# Patient Record
Sex: FEMALE | Race: WHITE | NOT HISPANIC OR LATINO
[De-identification: names, ages, dates, MRNs, and addresses within clinical notes are randomized per-mention and may not be internally consistent; named-entity substitution may affect disease eponyms.]

---

## 2020-08-11 PROBLEM — Z00.00 ENCOUNTER FOR PREVENTIVE HEALTH EXAMINATION: Status: ACTIVE | Noted: 2020-08-11

## 2020-08-13 ENCOUNTER — APPOINTMENT (OUTPATIENT)
Dept: ORTHOPEDIC SURGERY | Facility: CLINIC | Age: 50
End: 2020-08-13

## 2020-08-14 ENCOUNTER — APPOINTMENT (OUTPATIENT)
Dept: ORTHOPEDIC SURGERY | Facility: CLINIC | Age: 50
End: 2020-08-14
Payer: COMMERCIAL

## 2020-08-14 VITALS
BODY MASS INDEX: 30.21 KG/M2 | WEIGHT: 160 LBS | HEART RATE: 66 BPM | SYSTOLIC BLOOD PRESSURE: 131 MMHG | DIASTOLIC BLOOD PRESSURE: 77 MMHG | HEIGHT: 61 IN

## 2020-08-14 DIAGNOSIS — M19.90 UNSPECIFIED OSTEOARTHRITIS, UNSPECIFIED SITE: ICD-10-CM

## 2020-08-14 DIAGNOSIS — Z87.39 PERSONAL HISTORY OF OTHER DISEASES OF THE MUSCULOSKELETAL SYSTEM AND CONNECTIVE TISSUE: ICD-10-CM

## 2020-08-14 DIAGNOSIS — Z78.9 OTHER SPECIFIED HEALTH STATUS: ICD-10-CM

## 2020-08-14 PROCEDURE — 73562 X-RAY EXAM OF KNEE 3: CPT | Mod: 50

## 2020-08-14 PROCEDURE — 99204 OFFICE O/P NEW MOD 45 MIN: CPT

## 2020-08-14 PROCEDURE — 73502 X-RAY EXAM HIP UNI 2-3 VIEWS: CPT | Mod: LT

## 2020-09-02 PROBLEM — Z78.9 DENIES ALCOHOL CONSUMPTION: Status: ACTIVE | Noted: 2020-09-02

## 2020-09-02 PROBLEM — M19.90 ARTHRITIS: Status: RESOLVED | Noted: 2020-09-02 | Resolved: 2020-09-02

## 2020-09-02 PROBLEM — Z87.39 HISTORY OF FIBROMYALGIA: Status: RESOLVED | Noted: 2020-09-02 | Resolved: 2020-09-02

## 2020-09-02 PROBLEM — Z78.9 DOES NOT USE TOBACCO: Status: ACTIVE | Noted: 2020-09-02

## 2020-09-14 NOTE — DISCUSSION/SUMMARY
[de-identified] : Right knee advanced degenerative joint disease, stable left total knee replacement, leg length discrepancy. \par The natural history and treatment of degenerative arthritis was discussed with the patient at length today. The spectrum of treatment including nonoperative modalities to surgical intervention was elucidated. Noninvasive and nonoperative treatment modalities include weight reduction, activity modification with low impact exercise,  as needed use of acetaminophen or anti-inflammatory medications if tolerated, glucosamine/chondroitin supplements, and physical therapy. Further treatments can include corticosteroid injection and the use of viscosupplementation with hyaluronic acid injections. Definitive surgical treatment can certainly include total joint arthroplasty also. The risks and benefits of each treatment options was discussed and all questions were answered.\par In view of lack of adequate pain relief with conservative (non-surgical) management protocol including physical therapy, home exercises, weight loss, activity modification, NSAIDS; the patient is recommended to consider a right Total Knee Replacement. \par \par The risks, benefits, alternatives, implications, complications including but not limited to pain, stiffness, bleeding, limp, wound breakdown, infection, bone fracture, nerve and vascular compromise, implant wear, fixation options depending on bone quality, instability, and durability issues and rehabilitation were discussed and relevant questions were addressed. The possibility of recurrent pain, no improvement in pain and actual worsening of the pain were also mentioned in conversation with the patient. Medical complications related to the patient's general medical health including deep vein thrombosis, pulmonary embolus, heart attack, stroke, death and other complications from anesthesia were discussed as well.  Anticoagulation prophylaxis medication options to address risks of deep vein thrombosis and pulmonary embolism were discussed and weighed against the risks of bleeding and wound healing complications. The patient elected Ecotrin/Xarelto prophylaxis with mechanical modalities.\par \par  I have reviewed the plan of care as well as a model of a total knee replacement implant equivalent to the one that will be used for their total knee replacement.  The patient agrees with the plan of care, as well as the use of implants for their total knee replacement.  The patient wishes to proceed and will undergo preoperative medical evaluation and clearance, attend the preoperative educational class and will schedule surgery appropriately.\par

## 2020-09-14 NOTE — HISTORY OF PRESENT ILLNESS
[Worsening] : worsening [8] : a current pain level of 8/10 [Constant] : ~He/She~ states the symptoms seem to be constant [Walking] : worsened by walking [Rest] : relieved by rest [de-identified] : Ms. GINAN PICKARD is a 50 year old female  presenting with right knee pain more than left knee pain, over the last couple years, now worsening. 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 She notes she has a leg length discrepancy as well, with her right leg longer than the left, and ambulates with a significant limp. \par She localizes the pain to the lateral anterior aspect of the bilateral knees. She  describes the pain as throbbing, shooting, and states the pain is present when she is moving (walking, climbing stairs, standing). She admits to occasional swelling, clicking and grinding of the knee. She has occasional buckling as well. \par She has been treated conservatively with physical therapy, cortisone injection in February 2020, along with use of medications for pain with no relief. She notes her right knee has become more knock-kneed over the last couple years as well.  she admits to limitations of quality of life, and is here to discuss options for treatment.\par

## 2020-09-14 NOTE — PHYSICAL EXAM
[de-identified] : On general examination the patient is adequately groomed and nourished. The vital parameters are as recorded. \par There is no lymphedema or diffuse swelling, no varicose veins, no skin warmth/erythema/scars/swelling, no ulcers and no palpable lymph nodes or masses in both lower extremities. Bilateral pedal pulses are well palpable.\par Upper Extremity:\par Both right and left upper extremities are unremarkable in terms of skin rash, lesions, pigmentation, redness, tenderness, swelling, joint instability, abnormal deformity or crepitus. The overall range of motion, sensation, motor tone and strength testing are normal.\par \par Knee Exam:\par The gait is antalgic, with a leg length deformity, right leg longer than left, with a significant limp due to discrepancy. \par Knee alignment:   right knee significant valgus, with left knee neutral. \par Both knees demonstrate no scars and the skin has no warmth, erythema, swelling or tenderness. \par Both knees have a range of motion of\par Extension:                    Right -5 degrees                        Left 0 degrees\par Flexion:                                   Right 110 degrees          Left 120 degrees\par Right Knee: There is lateral joint line tenderness. There is mild effusion. \par Rubén's test is positive. Shauna test is positive.\par Lachman's test, Anterior/Posterior Drawer test and Pivot Shift Tests are negative. \par There is right knee mediolateral laxity and no anteroposterior instability. \par Patella compression test is negative and patellofemoral tracking is normal with no lateral subluxation, apprehension or instability. \par Right knee quadriceps and hamstrings power is 4+.\par Left knee quadriceps and hamstrings power is 4+\par \par Hip Exam:\par The gait and station is normal\par The patient has unequal leg lengths and no pelvic tilt. Ramy/Braeden test is 7 inches on the right and 7 inches on the left. Active SLR is 60 degrees on the right and 60 degrees on the left. The right hip demonstrates no scars and the skin has no signs of inflammation or tenderness. The left hip demonstrates well healed scar from total hip replacement. \par Both Hips have a normal range of motion of flexion to 100 degrees, abduction 40 degrees, adduction 20 degrees, external rotation 40 degrees, internal rotation 20 degrees with symmetrical motion in flexion and extension. There is no flexion contracture, deformity or instability. Labral impingement tests are negative.\par Both hips flexor, abductor and extensor power is normal.\par  [de-identified] : The following radiographs were ordered and read by me during this patients visit. I reviewed each radiograph in detail with the patient and discussed the findings as highlighted below. \par AP, lateral and skyline views of the bilateral knees confirm advanced degenerative joint disease of the right knee with lateral joint line bone on bone articulation, along with valgus malalignment. There is moderate DJD of the left knee as well with osteophyte formation\par AP view of the pelvis reveal s/p left total hip replacement SROM, stable. \par

## 2020-12-15 ENCOUNTER — APPOINTMENT (OUTPATIENT)
Dept: ORTHOPEDIC SURGERY | Facility: CLINIC | Age: 50
End: 2020-12-15
Payer: COMMERCIAL

## 2020-12-15 PROCEDURE — 99214 OFFICE O/P EST MOD 30 MIN: CPT

## 2020-12-15 PROCEDURE — 99072 ADDL SUPL MATRL&STAF TM PHE: CPT

## 2020-12-15 NOTE — DATA REVIEWED
[de-identified] : 8/14/2020–right knee x-rays: 20 degree valgus deformity with severe degenerative change.

## 2020-12-15 NOTE — DISCUSSION/SUMMARY
[de-identified] : This is a 50-year-old female with end-stage valgus right degenerative knee arthritis.  She has failed multiple nonoperative treatment modalities and is therefore indicated for a right total knee arthroplasty. We discussed risks, benefits and alternatives. Rationale of care was reviewed and all questions were answered. Surgical risks include but are not limited to infection, bleeding, nerve damage, chronic pain, loss of ROM, fracture, VTE, and need for further surgical procedures. Patient understood all of these risks and would like to proceed. \par \par Given that she had a recent steroid injection 3 weeks ago, we will have to delay the surgery due to the increased risks of infection when performing knee arthroplasty within 3 months of a steroid injection.  We will therefore plan for right total knee arthroplasty on 3/11/2020.  The patient will call the office in late January/early February at which point I will order an MRI for preoperative planning of the PSI cutting guides.

## 2020-12-15 NOTE — HISTORY OF PRESENT ILLNESS
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 Pitt, [place]), s/p R knee partial meniscectomy (2013, [place]) who is presenting for evaluation of right knee DJD.  She was seen by Dr. Flores 8/2020, who indicated her for total knee arthroplasty.  She continues to have severe pain despite a recent injection 3 weeks ago by Dr. Finkelstein, her pain management specialist.  She has 0 out of 10 pain at rest and 10 out of 10 pain with walking.  The pain is localized to the lateral aspect of her knee and does not radiate.  She generally uses a cane at baseline and cannot go up and down stairs very well due to the pain.  She has a chronic leg length discrepancy with the left leg shorter than the right, uses a shoe lift.  She is currently taking Duexis for pain control, without relief.  Denies any constitutional symptoms.  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

## 2020-12-15 NOTE — PHYSICAL EXAM
[FreeTextEntry1] : General: well nourished, in no acute distress, alert and oriented to person, place and time.\par Psychiatric: normal mood and affect, no abnormal movements or speech patterns.\par Eyes: vision intact without deficits, sclera and conjunctiva were normal, pupils were equal in size. \par ENT: Ears and nose were normal in appearance. No thyromegaly.\par Lymph: no enlarged nodes, no lymphedema in extremity.\par Respiratory: Normal respiratory rhythm and effort. No wheezing detected without auscultation. No shortness of breath or respiratory distress.\par Cardiac: no cardiac related leg swelling, 2+ peripheral pulses.\par Neurology: normal gross sensation in extremities to light touch.\par Abdomen: soft, non-tender, tympanic, no masses.\par \par RLE:\par \par Skin CDI. Mild effusion. No swelling, or ecchymosis. ROM: 0-130 degrees w/ pain. No varus/valgus instability. +lateral joint line TTP. No TTP over quadriceps/patellar tendon. No TTP over tibial tubercle or pes insertion. No palpable masses. No lymphedema.\par Neg Lachman. \par Alignment: Severe valgus, ~10-15 degrees.\par 1-2cm LLD. \par EHL/FHL/GS/TA 5/5. S/S/SP/DP/T SILT. Toes warm, BCR. Compartments soft.

## 2021-01-19 ENCOUNTER — APPOINTMENT (OUTPATIENT)
Dept: ORTHOPEDIC SURGERY | Facility: CLINIC | Age: 51
End: 2021-01-19

## 2021-02-22 ENCOUNTER — FORM ENCOUNTER (OUTPATIENT)
Age: 51
End: 2021-02-22

## 2021-02-26 ENCOUNTER — APPOINTMENT (OUTPATIENT)
Dept: MRI IMAGING | Facility: CLINIC | Age: 51
End: 2021-02-26
Payer: COMMERCIAL

## 2021-02-26 ENCOUNTER — RESULT REVIEW (OUTPATIENT)
Age: 51
End: 2021-02-26

## 2021-02-26 ENCOUNTER — OUTPATIENT (OUTPATIENT)
Dept: OUTPATIENT SERVICES | Facility: HOSPITAL | Age: 51
LOS: 1 days | End: 2021-02-26
Payer: MEDICAID

## 2021-02-26 DIAGNOSIS — M17.11 UNILATERAL PRIMARY OSTEOARTHRITIS, RIGHT KNEE: ICD-10-CM

## 2021-02-26 PROCEDURE — 73721 MRI JNT OF LWR EXTRE W/O DYE: CPT

## 2021-02-26 PROCEDURE — 73721 MRI JNT OF LWR EXTRE W/O DYE: CPT | Mod: 26,RT

## 2021-03-01 ENCOUNTER — APPOINTMENT (OUTPATIENT)
Dept: ORTHOPEDIC SURGERY | Facility: CLINIC | Age: 51
End: 2021-03-01
Payer: COMMERCIAL

## 2021-03-01 VITALS
BODY MASS INDEX: 31.15 KG/M2 | SYSTOLIC BLOOD PRESSURE: 136 MMHG | HEART RATE: 79 BPM | OXYGEN SATURATION: 99 % | WEIGHT: 165 LBS | DIASTOLIC BLOOD PRESSURE: 88 MMHG | HEIGHT: 61 IN

## 2021-03-01 PROCEDURE — 99072 ADDL SUPL MATRL&STAF TM PHE: CPT

## 2021-03-01 PROCEDURE — 73562 X-RAY EXAM OF KNEE 3: CPT | Mod: RT

## 2021-03-01 PROCEDURE — 99214 OFFICE O/P EST MOD 30 MIN: CPT

## 2021-03-01 NOTE — DATA REVIEWED
[de-identified] : 3/1/21-right knee x-rays (AP, varus and valgus stress views): 20 degree valgus deformity with severe degenerative change- possibly correctable but may be due to change in rotation of xray. No fx's or lesions.

## 2021-03-01 NOTE — HISTORY OF PRESENT ILLNESS
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 Pitt, [place]), s/p R knee partial meniscectomy (2013, [place]) who returns for FU of R knee DJD. She was seen by Dr. Flores 8/2020, who indicated her for total knee arthroplasty. She continues to have severe pain despite a previous steroid injection, NSAIDs, and PT. She is here today to discuss TKA. \par

## 2021-03-01 NOTE — DISCUSSION/SUMMARY
[de-identified] : This is a 51-year-old female with end-stage valgus right degenerative knee arthritis. She has failed multiple nonoperative treatment modalities and is therefore indicated for a right total knee arthroplasty. We discussed risks, benefits and alternatives. Rationale of care was reviewed and all questions were answered. Surgical risks include but are not limited to infection, bleeding, nerve damage, chronic pain, loss of ROM, fracture, VTE, and need for further surgical procedures. Patient understood all of these risks and would like to proceed. \par \par Tentative surgical date is for 3/25/21.\par

## 2021-03-01 NOTE — PHYSICAL EXAM
[FreeTextEntry1] : General: well nourished, in no acute distress, alert and oriented to person, place and time.\par Psychiatric: normal mood and affect, no abnormal movements or speech patterns.\par Eyes: vision intact without deficits, sclera and conjunctiva were normal, pupils were equal in size. \par ENT: Ears and nose were normal in appearance. No thyromegaly.\par Lymph: no enlarged nodes, no lymphedema in extremity.\par Respiratory: Normal respiratory rhythm and effort. No wheezing detected without auscultation. No shortness of breath or respiratory distress.\par Cardiac: no cardiac related leg swelling, 2+ peripheral pulses.\par Neurology: normal gross sensation in extremities to light touch.\par Abdomen: soft, non-tender, tympanic, no masses.\par \par RLE:\par \par ROM: 0-130 degrees w/ pain. No varus/valgus instability. +lateral joint line TTP. No TTP over quadriceps/patellar tendon. No TTP over tibial tubercle or pes insertion. No palpable masses. No lymphedema.\par Neg Lachman. \par Alignment: Severe valgus, ~10-15 degrees.\par 1-2cm LLD. \par EHL/FHL/GS/TA 5/5. S/S/SP/DP/T SILT. Toes warm, BCR. Compartments soft. \par \par

## 2021-03-08 ENCOUNTER — OUTPATIENT (OUTPATIENT)
Dept: OUTPATIENT SERVICES | Facility: HOSPITAL | Age: 51
LOS: 1 days | End: 2021-03-08
Payer: COMMERCIAL

## 2021-03-08 VITALS
SYSTOLIC BLOOD PRESSURE: 118 MMHG | RESPIRATION RATE: 16 BRPM | WEIGHT: 167.99 LBS | HEART RATE: 65 BPM | DIASTOLIC BLOOD PRESSURE: 70 MMHG | TEMPERATURE: 98 F | OXYGEN SATURATION: 99 % | HEIGHT: 59 IN

## 2021-03-08 DIAGNOSIS — M17.0 BILATERAL PRIMARY OSTEOARTHRITIS OF KNEE: ICD-10-CM

## 2021-03-08 DIAGNOSIS — Z98.890 OTHER SPECIFIED POSTPROCEDURAL STATES: Chronic | ICD-10-CM

## 2021-03-08 DIAGNOSIS — Z98.84 BARIATRIC SURGERY STATUS: Chronic | ICD-10-CM

## 2021-03-08 DIAGNOSIS — T78.40XA ALLERGY, UNSPECIFIED, INITIAL ENCOUNTER: ICD-10-CM

## 2021-03-08 DIAGNOSIS — Z90.89 ACQUIRED ABSENCE OF OTHER ORGANS: Chronic | ICD-10-CM

## 2021-03-08 DIAGNOSIS — Z96.642 PRESENCE OF LEFT ARTIFICIAL HIP JOINT: Chronic | ICD-10-CM

## 2021-03-08 LAB
ANION GAP SERPL CALC-SCNC: 11 MMOL/L — SIGNIFICANT CHANGE UP (ref 7–14)
APPEARANCE UR: CLEAR — SIGNIFICANT CHANGE UP
BILIRUB UR-MCNC: NEGATIVE — SIGNIFICANT CHANGE UP
BLD GP AB SCN SERPL QL: POSITIVE — SIGNIFICANT CHANGE UP
BUN SERPL-MCNC: 24 MG/DL — HIGH (ref 7–23)
CALCIUM SERPL-MCNC: 9.7 MG/DL — SIGNIFICANT CHANGE UP (ref 8.4–10.5)
CHLORIDE SERPL-SCNC: 103 MMOL/L — SIGNIFICANT CHANGE UP (ref 98–107)
CO2 SERPL-SCNC: 25 MMOL/L — SIGNIFICANT CHANGE UP (ref 22–31)
COLOR SPEC: SIGNIFICANT CHANGE UP
CREAT SERPL-MCNC: 0.58 MG/DL — SIGNIFICANT CHANGE UP (ref 0.5–1.3)
DIFF PNL FLD: NEGATIVE — SIGNIFICANT CHANGE UP
GLUCOSE SERPL-MCNC: 84 MG/DL — SIGNIFICANT CHANGE UP (ref 70–99)
GLUCOSE UR QL: NEGATIVE — SIGNIFICANT CHANGE UP
HCG SERPL-ACNC: <5 MIU/ML — SIGNIFICANT CHANGE UP
HCT VFR BLD CALC: 43 % — SIGNIFICANT CHANGE UP (ref 34.5–45)
HGB BLD-MCNC: 13.7 G/DL — SIGNIFICANT CHANGE UP (ref 11.5–15.5)
KETONES UR-MCNC: NEGATIVE — SIGNIFICANT CHANGE UP
LEUKOCYTE ESTERASE UR-ACNC: NEGATIVE — SIGNIFICANT CHANGE UP
MCHC RBC-ENTMCNC: 30.8 PG — SIGNIFICANT CHANGE UP (ref 27–34)
MCHC RBC-ENTMCNC: 31.9 GM/DL — LOW (ref 32–36)
MCV RBC AUTO: 96.6 FL — SIGNIFICANT CHANGE UP (ref 80–100)
NITRITE UR-MCNC: NEGATIVE — SIGNIFICANT CHANGE UP
NRBC # BLD: 0 /100 WBCS — SIGNIFICANT CHANGE UP
NRBC # FLD: 0 K/UL — SIGNIFICANT CHANGE UP
PH UR: 6 — SIGNIFICANT CHANGE UP (ref 5–8)
PLATELET # BLD AUTO: 335 K/UL — SIGNIFICANT CHANGE UP (ref 150–400)
POTASSIUM SERPL-MCNC: 4.4 MMOL/L — SIGNIFICANT CHANGE UP (ref 3.5–5.3)
POTASSIUM SERPL-SCNC: 4.4 MMOL/L — SIGNIFICANT CHANGE UP (ref 3.5–5.3)
PROT UR-MCNC: NEGATIVE — SIGNIFICANT CHANGE UP
RBC # BLD: 4.45 M/UL — SIGNIFICANT CHANGE UP (ref 3.8–5.2)
RBC # FLD: 12.5 % — SIGNIFICANT CHANGE UP (ref 10.3–14.5)
RH IG SCN BLD-IMP: POSITIVE — SIGNIFICANT CHANGE UP
SODIUM SERPL-SCNC: 139 MMOL/L — SIGNIFICANT CHANGE UP (ref 135–145)
SP GR SPEC: 1.01 — SIGNIFICANT CHANGE UP (ref 1.01–1.02)
UROBILINOGEN FLD QL: SIGNIFICANT CHANGE UP
WBC # BLD: 5.25 K/UL — SIGNIFICANT CHANGE UP (ref 3.8–10.5)
WBC # FLD AUTO: 5.25 K/UL — SIGNIFICANT CHANGE UP (ref 3.8–10.5)

## 2021-03-08 PROCEDURE — 86077 PHYS BLOOD BANK SERV XMATCH: CPT

## 2021-03-08 PROCEDURE — 93010 ELECTROCARDIOGRAM REPORT: CPT

## 2021-03-08 RX ORDER — SODIUM CHLORIDE 9 MG/ML
3 INJECTION INTRAMUSCULAR; INTRAVENOUS; SUBCUTANEOUS EVERY 8 HOURS
Refills: 0 | Status: DISCONTINUED | OUTPATIENT
Start: 2021-03-25 | End: 2021-04-01

## 2021-03-08 RX ORDER — ACETAMINOPHEN 500 MG
975 TABLET ORAL ONCE
Refills: 0 | Status: DISCONTINUED | OUTPATIENT
Start: 2021-03-25 | End: 2021-03-27

## 2021-03-08 NOTE — H&P PST ADULT - NEGATIVE GENERAL GENITOURINARY SYMPTOMS
Had UTI in January 2021 treated with antibiotic, no reoccurrence/no hematuria/no renal colic/no flank pain L/no flank pain R/no urine discoloration

## 2021-03-08 NOTE — H&P PST ADULT - NSICDXPASTSURGICALHX_GEN_ALL_CORE_FT
PAST SURGICAL HISTORY:  H/O bariatric surgery sleeve 2016    H/O lithotripsy 2017    H/O meniscectomy of right knee     History of tonsillectomy     History of total hip replacement, left 2005

## 2021-03-08 NOTE — H&P PST ADULT - NSICDXPROBLEM_GEN_ALL_CORE_FT
PROBLEM DIAGNOSES  Problem: Bilateral primary osteoarthritis of knee  Assessment and Plan: Patient tentatively scheduled for  right total knee arthroplasty on 3/25/21.  Pre-op instructions provided. Pt given verbal and written instructions with teach back on chlorhexidine shampoo and pepcid. Pt verbalized understanding with return demonstration.   Covid testing scheduled prior to surgery as per patient.   Patient scheduled for medical evaluation as per surgeon, copy requested.   Comparison EKG requested   Urine cup provided for day of procedure pregnancy test.     Problem: Allergy  Assessment and Plan: Patient allergic to PCN. OR booking notified.

## 2021-03-08 NOTE — H&P PST ADULT - HISTORY OF PRESENT ILLNESS
51 year old female presents to Presurgical testing with diagnosis of Bilateral primary osteoarthritis of knee scheduled for right total knee arthroplasty. Patient states that right knee pain has been going on for the last few years, tried conservative treatment but no relief.

## 2021-03-08 NOTE — H&P PST ADULT - ASSESSMENT
Bilateral primary osteoarthritis of knee Bilateral primary osteoarthritis of knee      Attending:    I have consented the patient for right TKA. We discussed risks, benefits and alternatives. Rationale of care was reviewed and all questions were answered. Surgical risks include but are not limited to infection, bleeding, nerve damage, chronic pain, limited ROM, weakness, LLD, fracture, component loosening, VTE, loss of life/limb, and need for further surgical procedures.  The patient understood all of this and would like to proceed.

## 2021-03-08 NOTE — H&P PST ADULT - NEGATIVE RESPIRATORY AND THORAX SYMPTOMS
Pt sleeping with easy respirations on the cart. Awaiting CT exams.   no wheezing/no dyspnea/no cough

## 2021-03-09 LAB
MRSA PCR RESULT.: SIGNIFICANT CHANGE UP
S AUREUS DNA NOSE QL NAA+PROBE: DETECTED

## 2021-03-12 LAB
CULTURE RESULTS: NO GROWTH — SIGNIFICANT CHANGE UP
SPECIMEN SOURCE: SIGNIFICANT CHANGE UP

## 2021-03-20 DIAGNOSIS — Z01.818 ENCOUNTER FOR OTHER PREPROCEDURAL EXAMINATION: ICD-10-CM

## 2021-03-22 ENCOUNTER — APPOINTMENT (OUTPATIENT)
Dept: DISASTER EMERGENCY | Facility: CLINIC | Age: 51
End: 2021-03-22

## 2021-03-23 LAB — SARS-COV-2 N GENE NPH QL NAA+PROBE: NOT DETECTED

## 2021-03-24 ENCOUNTER — TRANSCRIPTION ENCOUNTER (OUTPATIENT)
Age: 51
End: 2021-03-24

## 2021-03-24 NOTE — ASU PATIENT PROFILE, ADULT - PSH
H/O bariatric surgery  sleeve 2016  H/O lithotripsy  2017  H/O meniscectomy of right knee    History of tonsillectomy    History of total hip replacement, left  2005

## 2021-03-24 NOTE — ASU PATIENT PROFILE, ADULT - PMH
Adolescent idiopathic scoliosis of lumbosacral spine  1979  Bilateral primary osteoarthritis of knee    Fibromyalgia  2015  Kidney stone

## 2021-03-25 ENCOUNTER — INPATIENT (INPATIENT)
Facility: HOSPITAL | Age: 51
LOS: 6 days | Discharge: NURSING, REHAB OR LONG-TERM CARE | End: 2021-04-01
Attending: ORTHOPAEDIC SURGERY | Admitting: ORTHOPAEDIC SURGERY
Payer: COMMERCIAL

## 2021-03-25 ENCOUNTER — RESULT REVIEW (OUTPATIENT)
Age: 51
End: 2021-03-25

## 2021-03-25 ENCOUNTER — APPOINTMENT (OUTPATIENT)
Dept: ORTHOPEDIC SURGERY | Facility: HOSPITAL | Age: 51
End: 2021-03-25

## 2021-03-25 VITALS
RESPIRATION RATE: 16 BRPM | DIASTOLIC BLOOD PRESSURE: 74 MMHG | HEIGHT: 59 IN | WEIGHT: 167.99 LBS | TEMPERATURE: 97 F | OXYGEN SATURATION: 98 % | SYSTOLIC BLOOD PRESSURE: 126 MMHG | HEART RATE: 74 BPM

## 2021-03-25 DIAGNOSIS — Z98.84 BARIATRIC SURGERY STATUS: Chronic | ICD-10-CM

## 2021-03-25 DIAGNOSIS — Z98.890 OTHER SPECIFIED POSTPROCEDURAL STATES: Chronic | ICD-10-CM

## 2021-03-25 DIAGNOSIS — M17.0 BILATERAL PRIMARY OSTEOARTHRITIS OF KNEE: ICD-10-CM

## 2021-03-25 DIAGNOSIS — Z90.89 ACQUIRED ABSENCE OF OTHER ORGANS: Chronic | ICD-10-CM

## 2021-03-25 DIAGNOSIS — Z96.642 PRESENCE OF LEFT ARTIFICIAL HIP JOINT: Chronic | ICD-10-CM

## 2021-03-25 LAB
ANION GAP SERPL CALC-SCNC: 7 MMOL/L — SIGNIFICANT CHANGE UP (ref 7–14)
BUN SERPL-MCNC: 17 MG/DL — SIGNIFICANT CHANGE UP (ref 7–23)
CALCIUM SERPL-MCNC: 9.2 MG/DL — SIGNIFICANT CHANGE UP (ref 8.4–10.5)
CHLORIDE SERPL-SCNC: 101 MMOL/L — SIGNIFICANT CHANGE UP (ref 98–107)
CO2 SERPL-SCNC: 28 MMOL/L — SIGNIFICANT CHANGE UP (ref 22–31)
CREAT SERPL-MCNC: 0.53 MG/DL — SIGNIFICANT CHANGE UP (ref 0.5–1.3)
GLUCOSE SERPL-MCNC: 88 MG/DL — SIGNIFICANT CHANGE UP (ref 70–99)
HCG UR QL: NEGATIVE — SIGNIFICANT CHANGE UP
HCT VFR BLD CALC: 40.1 % — SIGNIFICANT CHANGE UP (ref 34.5–45)
HGB BLD-MCNC: 13.1 G/DL — SIGNIFICANT CHANGE UP (ref 11.5–15.5)
MCHC RBC-ENTMCNC: 31.3 PG — SIGNIFICANT CHANGE UP (ref 27–34)
MCHC RBC-ENTMCNC: 32.7 GM/DL — SIGNIFICANT CHANGE UP (ref 32–36)
MCV RBC AUTO: 95.7 FL — SIGNIFICANT CHANGE UP (ref 80–100)
NRBC # BLD: 0 /100 WBCS — SIGNIFICANT CHANGE UP
NRBC # FLD: 0 K/UL — SIGNIFICANT CHANGE UP
PLATELET # BLD AUTO: 267 K/UL — SIGNIFICANT CHANGE UP (ref 150–400)
POTASSIUM SERPL-MCNC: 3.9 MMOL/L — SIGNIFICANT CHANGE UP (ref 3.5–5.3)
POTASSIUM SERPL-SCNC: 3.9 MMOL/L — SIGNIFICANT CHANGE UP (ref 3.5–5.3)
RBC # BLD: 4.19 M/UL — SIGNIFICANT CHANGE UP (ref 3.8–5.2)
RBC # FLD: 12.8 % — SIGNIFICANT CHANGE UP (ref 10.3–14.5)
SODIUM SERPL-SCNC: 136 MMOL/L — SIGNIFICANT CHANGE UP (ref 135–145)
WBC # BLD: 4.91 K/UL — SIGNIFICANT CHANGE UP (ref 3.8–10.5)
WBC # FLD AUTO: 4.91 K/UL — SIGNIFICANT CHANGE UP (ref 3.8–10.5)

## 2021-03-25 PROCEDURE — 88311 DECALCIFY TISSUE: CPT | Mod: 26

## 2021-03-25 PROCEDURE — 73560 X-RAY EXAM OF KNEE 1 OR 2: CPT | Mod: 26,RT

## 2021-03-25 PROCEDURE — 88305 TISSUE EXAM BY PATHOLOGIST: CPT | Mod: 26

## 2021-03-25 RX ORDER — ASPIRIN/CALCIUM CARB/MAGNESIUM 324 MG
325 TABLET ORAL EVERY 12 HOURS
Refills: 0 | Status: DISCONTINUED | OUTPATIENT
Start: 2021-03-25 | End: 2021-04-01

## 2021-03-25 RX ORDER — GABAPENTIN 400 MG/1
300 CAPSULE ORAL ONCE
Refills: 0 | Status: COMPLETED | OUTPATIENT
Start: 2021-03-25 | End: 2021-03-25

## 2021-03-25 RX ORDER — SODIUM CHLORIDE 9 MG/ML
1000 INJECTION, SOLUTION INTRAVENOUS
Refills: 0 | Status: DISCONTINUED | OUTPATIENT
Start: 2021-03-25 | End: 2021-03-25

## 2021-03-25 RX ORDER — DEXAMETHASONE 0.5 MG/5ML
10 ELIXIR ORAL ONCE
Refills: 0 | Status: COMPLETED | OUTPATIENT
Start: 2021-03-25 | End: 2021-03-25

## 2021-03-25 RX ORDER — OXYCODONE HYDROCHLORIDE 5 MG/1
5 TABLET ORAL
Refills: 0 | Status: DISCONTINUED | OUTPATIENT
Start: 2021-03-25 | End: 2021-03-26

## 2021-03-25 RX ORDER — ACETAMINOPHEN 500 MG
975 TABLET ORAL EVERY 8 HOURS
Refills: 0 | Status: DISCONTINUED | OUTPATIENT
Start: 2021-03-25 | End: 2021-04-01

## 2021-03-25 RX ORDER — TRAMADOL HYDROCHLORIDE 50 MG/1
50 TABLET ORAL EVERY 8 HOURS
Refills: 0 | Status: DISCONTINUED | OUTPATIENT
Start: 2021-03-25 | End: 2021-03-27

## 2021-03-25 RX ORDER — SODIUM CHLORIDE 9 MG/ML
1000 INJECTION, SOLUTION INTRAVENOUS
Refills: 0 | Status: DISCONTINUED | OUTPATIENT
Start: 2021-03-25 | End: 2021-03-29

## 2021-03-25 RX ORDER — MAGNESIUM HYDROXIDE 400 MG/1
30 TABLET, CHEWABLE ORAL DAILY
Refills: 0 | Status: DISCONTINUED | OUTPATIENT
Start: 2021-03-25 | End: 2021-04-01

## 2021-03-25 RX ORDER — FENTANYL CITRATE 50 UG/ML
25 INJECTION INTRAVENOUS
Refills: 0 | Status: DISCONTINUED | OUTPATIENT
Start: 2021-03-25 | End: 2021-03-25

## 2021-03-25 RX ORDER — HYDROMORPHONE HYDROCHLORIDE 2 MG/ML
0.5 INJECTION INTRAMUSCULAR; INTRAVENOUS; SUBCUTANEOUS ONCE
Refills: 0 | Status: DISCONTINUED | OUTPATIENT
Start: 2021-03-25 | End: 2021-04-01

## 2021-03-25 RX ORDER — GABAPENTIN 400 MG/1
100 CAPSULE ORAL THREE TIMES A DAY
Refills: 0 | Status: DISCONTINUED | OUTPATIENT
Start: 2021-03-25 | End: 2021-04-01

## 2021-03-25 RX ORDER — SODIUM CHLORIDE 9 MG/ML
500 INJECTION INTRAMUSCULAR; INTRAVENOUS; SUBCUTANEOUS ONCE
Refills: 0 | Status: COMPLETED | OUTPATIENT
Start: 2021-03-25 | End: 2021-03-25

## 2021-03-25 RX ORDER — ONDANSETRON 8 MG/1
4 TABLET, FILM COATED ORAL EVERY 6 HOURS
Refills: 0 | Status: DISCONTINUED | OUTPATIENT
Start: 2021-03-25 | End: 2021-04-01

## 2021-03-25 RX ORDER — POLYETHYLENE GLYCOL 3350 17 G/17G
17 POWDER, FOR SOLUTION ORAL AT BEDTIME
Refills: 0 | Status: DISCONTINUED | OUTPATIENT
Start: 2021-03-25 | End: 2021-04-01

## 2021-03-25 RX ORDER — SENNA PLUS 8.6 MG/1
2 TABLET ORAL AT BEDTIME
Refills: 0 | Status: DISCONTINUED | OUTPATIENT
Start: 2021-03-25 | End: 2021-04-01

## 2021-03-25 RX ORDER — SODIUM CHLORIDE 9 MG/ML
1000 INJECTION INTRAMUSCULAR; INTRAVENOUS; SUBCUTANEOUS ONCE
Refills: 0 | Status: COMPLETED | OUTPATIENT
Start: 2021-03-25 | End: 2021-03-25

## 2021-03-25 RX ORDER — PANTOPRAZOLE SODIUM 20 MG/1
40 TABLET, DELAYED RELEASE ORAL ONCE
Refills: 0 | Status: COMPLETED | OUTPATIENT
Start: 2021-03-25 | End: 2021-03-25

## 2021-03-25 RX ORDER — TRAMADOL HYDROCHLORIDE 50 MG/1
50 TABLET ORAL ONCE
Refills: 0 | Status: COMPLETED | OUTPATIENT
Start: 2021-03-25 | End: 2021-03-25

## 2021-03-25 RX ORDER — INFLUENZA VIRUS VACCINE 15; 15; 15; 15 UG/.5ML; UG/.5ML; UG/.5ML; UG/.5ML
0.5 SUSPENSION INTRAMUSCULAR ONCE
Refills: 0 | Status: ACTIVE | OUTPATIENT
Start: 2021-03-25 | End: 2022-02-21

## 2021-03-25 RX ORDER — FOLIC ACID 0.8 MG
1 TABLET ORAL DAILY
Refills: 0 | Status: DISCONTINUED | OUTPATIENT
Start: 2021-03-25 | End: 2021-03-29

## 2021-03-25 RX ORDER — DEXAMETHASONE 0.5 MG/5ML
10 ELIXIR ORAL ONCE
Refills: 0 | Status: COMPLETED | OUTPATIENT
Start: 2021-03-26 | End: 2021-03-26

## 2021-03-25 RX ORDER — KETOROLAC TROMETHAMINE 30 MG/ML
15 SYRINGE (ML) INJECTION EVERY 6 HOURS
Refills: 0 | Status: DISCONTINUED | OUTPATIENT
Start: 2021-03-25 | End: 2021-03-26

## 2021-03-25 RX ORDER — HYDROMORPHONE HYDROCHLORIDE 2 MG/ML
0.25 INJECTION INTRAMUSCULAR; INTRAVENOUS; SUBCUTANEOUS
Refills: 0 | Status: DISCONTINUED | OUTPATIENT
Start: 2021-03-25 | End: 2021-03-25

## 2021-03-25 RX ORDER — POVIDONE-IODINE 5 %
1 AEROSOL (ML) TOPICAL ONCE
Refills: 0 | Status: COMPLETED | OUTPATIENT
Start: 2021-03-25 | End: 2021-03-25

## 2021-03-25 RX ORDER — VANCOMYCIN HCL 1 G
1000 VIAL (EA) INTRAVENOUS ONCE
Refills: 0 | Status: COMPLETED | OUTPATIENT
Start: 2021-03-25 | End: 2021-03-25

## 2021-03-25 RX ORDER — FERROUS SULFATE 325(65) MG
325 TABLET ORAL DAILY
Refills: 0 | Status: DISCONTINUED | OUTPATIENT
Start: 2021-03-25 | End: 2021-04-01

## 2021-03-25 RX ORDER — ASCORBIC ACID 60 MG
500 TABLET,CHEWABLE ORAL
Refills: 0 | Status: DISCONTINUED | OUTPATIENT
Start: 2021-03-25 | End: 2021-04-01

## 2021-03-25 RX ORDER — OXYCODONE HYDROCHLORIDE 5 MG/1
10 TABLET ORAL
Refills: 0 | Status: DISCONTINUED | OUTPATIENT
Start: 2021-03-25 | End: 2021-03-26

## 2021-03-25 RX ORDER — ACETAMINOPHEN 500 MG
1000 TABLET ORAL ONCE
Refills: 0 | Status: COMPLETED | OUTPATIENT
Start: 2021-03-25 | End: 2021-03-25

## 2021-03-25 RX ORDER — CEFAZOLIN SODIUM 1 G
2000 VIAL (EA) INJECTION EVERY 8 HOURS
Refills: 0 | Status: DISCONTINUED | OUTPATIENT
Start: 2021-03-25 | End: 2021-03-25

## 2021-03-25 RX ORDER — ONDANSETRON 8 MG/1
4 TABLET, FILM COATED ORAL ONCE
Refills: 0 | Status: COMPLETED | OUTPATIENT
Start: 2021-03-25 | End: 2021-03-25

## 2021-03-25 RX ORDER — PANTOPRAZOLE SODIUM 20 MG/1
40 TABLET, DELAYED RELEASE ORAL
Refills: 0 | Status: DISCONTINUED | OUTPATIENT
Start: 2021-03-25 | End: 2021-04-01

## 2021-03-25 RX ADMIN — GABAPENTIN 100 MILLIGRAM(S): 400 CAPSULE ORAL at 13:50

## 2021-03-25 RX ADMIN — OXYCODONE HYDROCHLORIDE 10 MILLIGRAM(S): 5 TABLET ORAL at 20:15

## 2021-03-25 RX ADMIN — Medication 1 TABLET(S): at 13:50

## 2021-03-25 RX ADMIN — Medication 15 MILLIGRAM(S): at 21:12

## 2021-03-25 RX ADMIN — OXYCODONE HYDROCHLORIDE 10 MILLIGRAM(S): 5 TABLET ORAL at 15:58

## 2021-03-25 RX ADMIN — ONDANSETRON 4 MILLIGRAM(S): 8 TABLET, FILM COATED ORAL at 12:03

## 2021-03-25 RX ADMIN — HYDROMORPHONE HYDROCHLORIDE 0.25 MILLIGRAM(S): 2 INJECTION INTRAMUSCULAR; INTRAVENOUS; SUBCUTANEOUS at 12:15

## 2021-03-25 RX ADMIN — SODIUM CHLORIDE 1000 MILLILITER(S): 9 INJECTION INTRAMUSCULAR; INTRAVENOUS; SUBCUTANEOUS at 18:29

## 2021-03-25 RX ADMIN — OXYCODONE HYDROCHLORIDE 10 MILLIGRAM(S): 5 TABLET ORAL at 22:32

## 2021-03-25 RX ADMIN — Medication 1 TABLET(S): at 21:14

## 2021-03-25 RX ADMIN — Medication 102 MILLIGRAM(S): at 21:13

## 2021-03-25 RX ADMIN — Medication 500 MILLIGRAM(S): at 18:28

## 2021-03-25 RX ADMIN — GABAPENTIN 300 MILLIGRAM(S): 400 CAPSULE ORAL at 06:57

## 2021-03-25 RX ADMIN — Medication 1 APPLICATION(S): at 06:57

## 2021-03-25 RX ADMIN — POLYETHYLENE GLYCOL 3350 17 GRAM(S): 17 POWDER, FOR SOLUTION ORAL at 21:12

## 2021-03-25 RX ADMIN — SENNA PLUS 2 TABLET(S): 8.6 TABLET ORAL at 21:12

## 2021-03-25 RX ADMIN — OXYCODONE HYDROCHLORIDE 10 MILLIGRAM(S): 5 TABLET ORAL at 19:19

## 2021-03-25 RX ADMIN — Medication 1 TABLET(S): at 18:29

## 2021-03-25 RX ADMIN — SODIUM CHLORIDE 30 MILLILITER(S): 9 INJECTION, SOLUTION INTRAVENOUS at 06:56

## 2021-03-25 RX ADMIN — PANTOPRAZOLE SODIUM 40 MILLIGRAM(S): 20 TABLET, DELAYED RELEASE ORAL at 06:57

## 2021-03-25 RX ADMIN — TRAMADOL HYDROCHLORIDE 50 MILLIGRAM(S): 50 TABLET ORAL at 14:19

## 2021-03-25 RX ADMIN — Medication 400 MILLIGRAM(S): at 10:56

## 2021-03-25 RX ADMIN — TRAMADOL HYDROCHLORIDE 50 MILLIGRAM(S): 50 TABLET ORAL at 14:51

## 2021-03-25 RX ADMIN — OXYCODONE HYDROCHLORIDE 10 MILLIGRAM(S): 5 TABLET ORAL at 23:15

## 2021-03-25 RX ADMIN — OXYCODONE HYDROCHLORIDE 10 MILLIGRAM(S): 5 TABLET ORAL at 17:20

## 2021-03-25 RX ADMIN — OXYCODONE HYDROCHLORIDE 5 MILLIGRAM(S): 5 TABLET ORAL at 12:15

## 2021-03-25 RX ADMIN — SODIUM CHLORIDE 3 MILLILITER(S): 9 INJECTION INTRAMUSCULAR; INTRAVENOUS; SUBCUTANEOUS at 21:59

## 2021-03-25 RX ADMIN — Medication 325 MILLIGRAM(S): at 18:28

## 2021-03-25 RX ADMIN — SODIUM CHLORIDE 500 MILLILITER(S): 9 INJECTION INTRAMUSCULAR; INTRAVENOUS; SUBCUTANEOUS at 11:13

## 2021-03-25 RX ADMIN — HYDROMORPHONE HYDROCHLORIDE 0.25 MILLIGRAM(S): 2 INJECTION INTRAMUSCULAR; INTRAVENOUS; SUBCUTANEOUS at 11:59

## 2021-03-25 RX ADMIN — Medication 250 MILLIGRAM(S): at 21:59

## 2021-03-25 RX ADMIN — GABAPENTIN 100 MILLIGRAM(S): 400 CAPSULE ORAL at 21:12

## 2021-03-25 RX ADMIN — OXYCODONE HYDROCHLORIDE 5 MILLIGRAM(S): 5 TABLET ORAL at 11:59

## 2021-03-25 RX ADMIN — Medication 325 MILLIGRAM(S): at 13:50

## 2021-03-25 RX ADMIN — Medication 1 MILLIGRAM(S): at 13:50

## 2021-03-25 RX ADMIN — Medication 975 MILLIGRAM(S): at 18:28

## 2021-03-25 NOTE — PATIENT PROFILE ADULT - FUNCTIONAL SCREEN CURRENT LEVEL: SWALLOWING (IF SCORE 2 OR MORE FOR ANY ITEM, CONSULT REHAB SERVICES), MLM)
TANK RODRIGUEZ  75y, Male  Allergy: No Known Allergies    Hospital Day: 1d    Patient seen and examined earlier today. No acute events overnight.    PMH/PSH:  PAST MEDICAL & SURGICAL HISTORY:  COPD without exacerbation  Kidney disease  Hypothyroidism  Diabetes  HTN (hypertension)  H/O heart transplant    VITALS:  T(F): 99.7 (03-25-20 @ 08:47), Max: 104.2 (03-24-20 @ 20:30)  HR: 100 (03-25-20 @ 08:47)  BP: 152/84 (03-25-20 @ 04:30) (123/65 - 186/77)  RR: 17 (03-25-20 @ 08:47)  SpO2: 98% (03-25-20 @ 08:47)    TESTS & MEASUREMENTS:  Weight (Kg): 79.5 (03-24-20 @ 15:20)  BMI (kg/m2): 30.1 (03-24)    03-24-20 @ 07:01  -  03-25-20 @ 07:00  --------------------------------------------------------  IN: 0 mL / OUT: 600 mL / NET: -600 mL                       9.1    7.37  )-----------( 174      ( 25 Mar 2020 06:49 )             29.3     03-25    136  |  104  |  40<H>  ----------------------------<  66<L>  3.9   |  16<L>  |  3.6<H>    Ca    8.1<L>      25 Mar 2020 06:49    TPro  5.6<L>  /  Alb  3.5  /  TBili  <0.2  /  DBili  x   /  AST  26  /  ALT  21  /  AlkPhos  25<L>  03-25    LIVER FUNCTIONS - ( 25 Mar 2020 06:49 )  Alb: 3.5 g/dL / Pro: 5.6 g/dL / ALK PHOS: 25 U/L / ALT: 21 U/L / AST: 26 U/L / GGT: x           CARDIAC MARKERS ( 25 Mar 2020 06:49 )  x     / 0.09 ng/mL / x     / x     / x      CARDIAC MARKERS ( 24 Mar 2020 09:36 )  x     / 0.03 ng/mL / x     / x     / x        RECENT DIAGNOSTIC ORDERS:  COVID-19 PCR: STAT  Specimen Source: Nasopharyngeal (03-24-20 @ 11:33)  Transthoracic Echocardiogram:   Transport: Stretcher-Crib  Monitor: w/o Monitor  Addl Info: Patient is on isolation to r/o COVID -19. Please use appropriate PPE  Provider's Contact #: [phone] (03-24-20 @ 14:24)  Troponin T, Serum: 20:00 (03-24-20 @ 14:24)  Hemoglobin A1C with Mean Plasma Glucose: AM Sched. Collection: 25-Mar-2020 04:30 (03-24-20 @ 14:29)  Diet, Regular:   Consistent Carbohydrate No Snacks  DASH/TLC Sodium & Cholesterol Restricted (03-24-20 @ 16:13)    MEDICATIONS:  MEDICATIONS  (STANDING):  albuterol/ipratropium for Nebulization 3 milliLiter(s) Nebulizer every 6 hours  amLODIPine   Tablet 10 milliGRAM(s) Oral daily  atorvastatin 40 milliGRAM(s) Oral at bedtime  azithromycin  IVPB 500 milliGRAM(s) IV Intermittent every 24 hours  cefTRIAXone   IVPB 1000 milliGRAM(s) IV Intermittent every 24 hours  dextrose 5%. 1000 milliLiter(s) (50 mL/Hr) IV Continuous <Continuous>  dextrose 50% Injectable 12.5 Gram(s) IV Push once  dextrose 50% Injectable 25 Gram(s) IV Push once  dextrose 50% Injectable 25 Gram(s) IV Push once  everolimus (ZORTRESS) 1 milliGRAM(s) Oral two times a day  fenofibrate Tablet 48 milliGRAM(s) Oral daily  influenza   Vaccine 0.5 milliLiter(s) IntraMuscular once  insulin glargine Injectable (LANTUS) 10 Unit(s) SubCutaneous at bedtime  insulin lispro (HumaLOG) corrective regimen sliding scale   SubCutaneous three times a day before meals  levothyroxine 25 MICROGram(s) Oral daily  mycophenolate mofetil 1000 milliGRAM(s) Oral two times a day  pentoxifylline 400 milliGRAM(s) Oral at bedtime  pentoxifylline 800 milliGRAM(s) Oral daily  predniSONE   Tablet 2 milliGRAM(s) Oral two times a day  spironolactone 50 milliGRAM(s) Oral daily    MEDICATIONS  (PRN):  acetaminophen   Tablet .. 650 milliGRAM(s) Oral every 6 hours PRN Temp greater or equal to 38C (100.4F)  dextrose 40% Gel 15 Gram(s) Oral once PRN Blood Glucose LESS THAN 70 milliGRAM(s)/deciliter  glucagon  Injectable 1 milliGRAM(s) IntraMuscular once PRN Glucose LESS THAN 70 milligrams/deciliter    HOME MEDICATIONS:  CellCept (07-26)  everolimus (07-26)  insulin (07-26)  levothyroxine (07-26)  predniSONE (07-26)    REVIEW OF SYSTEMS:  All other review of systems is negative unless indicated above.     PHYSICAL EXAM:  GENERAL: NAD  HEENT: No Swelling  CHEST/LUNG: Good air entry, No wheezing  HEART: RRR, No murmurs  ABDOMEN: Soft, Bowel sounds present  EXTREMITIES:  No clubbing 0 = swallows foods/liquids without difficulty

## 2021-03-26 ENCOUNTER — TRANSCRIPTION ENCOUNTER (OUTPATIENT)
Age: 51
End: 2021-03-26

## 2021-03-26 DIAGNOSIS — Z29.9 ENCOUNTER FOR PROPHYLACTIC MEASURES, UNSPECIFIED: ICD-10-CM

## 2021-03-26 DIAGNOSIS — M17.0 BILATERAL PRIMARY OSTEOARTHRITIS OF KNEE: ICD-10-CM

## 2021-03-26 LAB
ANION GAP SERPL CALC-SCNC: 12 MMOL/L — SIGNIFICANT CHANGE UP (ref 7–14)
BUN SERPL-MCNC: 13 MG/DL — SIGNIFICANT CHANGE UP (ref 7–23)
CALCIUM SERPL-MCNC: 9.7 MG/DL — SIGNIFICANT CHANGE UP (ref 8.4–10.5)
CHLORIDE SERPL-SCNC: 102 MMOL/L — SIGNIFICANT CHANGE UP (ref 98–107)
CO2 SERPL-SCNC: 23 MMOL/L — SIGNIFICANT CHANGE UP (ref 22–31)
CREAT SERPL-MCNC: 0.43 MG/DL — LOW (ref 0.5–1.3)
GLUCOSE SERPL-MCNC: 172 MG/DL — HIGH (ref 70–99)
HCT VFR BLD CALC: 39.6 % — SIGNIFICANT CHANGE UP (ref 34.5–45)
HGB BLD-MCNC: 13.1 G/DL — SIGNIFICANT CHANGE UP (ref 11.5–15.5)
MCHC RBC-ENTMCNC: 31 PG — SIGNIFICANT CHANGE UP (ref 27–34)
MCHC RBC-ENTMCNC: 33.1 GM/DL — SIGNIFICANT CHANGE UP (ref 32–36)
MCV RBC AUTO: 93.6 FL — SIGNIFICANT CHANGE UP (ref 80–100)
NRBC # BLD: 0 /100 WBCS — SIGNIFICANT CHANGE UP
NRBC # FLD: 0 K/UL — SIGNIFICANT CHANGE UP
PLATELET # BLD AUTO: 284 K/UL — SIGNIFICANT CHANGE UP (ref 150–400)
POTASSIUM SERPL-MCNC: 3.9 MMOL/L — SIGNIFICANT CHANGE UP (ref 3.5–5.3)
POTASSIUM SERPL-SCNC: 3.9 MMOL/L — SIGNIFICANT CHANGE UP (ref 3.5–5.3)
RBC # BLD: 4.23 M/UL — SIGNIFICANT CHANGE UP (ref 3.8–5.2)
RBC # FLD: 12.3 % — SIGNIFICANT CHANGE UP (ref 10.3–14.5)
SODIUM SERPL-SCNC: 137 MMOL/L — SIGNIFICANT CHANGE UP (ref 135–145)
WBC # BLD: 8.96 K/UL — SIGNIFICANT CHANGE UP (ref 3.8–10.5)
WBC # FLD AUTO: 8.96 K/UL — SIGNIFICANT CHANGE UP (ref 3.8–10.5)

## 2021-03-26 PROCEDURE — 99223 1ST HOSP IP/OBS HIGH 75: CPT

## 2021-03-26 RX ORDER — CALCIUM CARBONATE 500(1250)
1 TABLET ORAL EVERY 6 HOURS
Refills: 0 | Status: DISCONTINUED | OUTPATIENT
Start: 2021-03-26 | End: 2021-04-01

## 2021-03-26 RX ORDER — HYDROMORPHONE HYDROCHLORIDE 2 MG/ML
2 INJECTION INTRAMUSCULAR; INTRAVENOUS; SUBCUTANEOUS
Refills: 0 | Status: DISCONTINUED | OUTPATIENT
Start: 2021-03-26 | End: 2021-03-27

## 2021-03-26 RX ORDER — HYDROMORPHONE HYDROCHLORIDE 2 MG/ML
4 INJECTION INTRAMUSCULAR; INTRAVENOUS; SUBCUTANEOUS
Refills: 0 | Status: DISCONTINUED | OUTPATIENT
Start: 2021-03-26 | End: 2021-03-27

## 2021-03-26 RX ADMIN — HYDROMORPHONE HYDROCHLORIDE 4 MILLIGRAM(S): 2 INJECTION INTRAMUSCULAR; INTRAVENOUS; SUBCUTANEOUS at 23:30

## 2021-03-26 RX ADMIN — GABAPENTIN 100 MILLIGRAM(S): 400 CAPSULE ORAL at 22:40

## 2021-03-26 RX ADMIN — Medication 500 MILLIGRAM(S): at 05:01

## 2021-03-26 RX ADMIN — OXYCODONE HYDROCHLORIDE 10 MILLIGRAM(S): 5 TABLET ORAL at 01:50

## 2021-03-26 RX ADMIN — Medication 1 MILLIGRAM(S): at 13:06

## 2021-03-26 RX ADMIN — OXYCODONE HYDROCHLORIDE 10 MILLIGRAM(S): 5 TABLET ORAL at 09:28

## 2021-03-26 RX ADMIN — Medication 975 MILLIGRAM(S): at 19:12

## 2021-03-26 RX ADMIN — Medication 15 MILLIGRAM(S): at 16:20

## 2021-03-26 RX ADMIN — OXYCODONE HYDROCHLORIDE 10 MILLIGRAM(S): 5 TABLET ORAL at 02:20

## 2021-03-26 RX ADMIN — Medication 325 MILLIGRAM(S): at 19:12

## 2021-03-26 RX ADMIN — Medication 1 TABLET(S): at 13:06

## 2021-03-26 RX ADMIN — Medication 1 TABLET(S): at 13:10

## 2021-03-26 RX ADMIN — Medication 975 MILLIGRAM(S): at 13:05

## 2021-03-26 RX ADMIN — OXYCODONE HYDROCHLORIDE 10 MILLIGRAM(S): 5 TABLET ORAL at 05:02

## 2021-03-26 RX ADMIN — Medication 975 MILLIGRAM(S): at 01:50

## 2021-03-26 RX ADMIN — OXYCODONE HYDROCHLORIDE 10 MILLIGRAM(S): 5 TABLET ORAL at 13:07

## 2021-03-26 RX ADMIN — OXYCODONE HYDROCHLORIDE 10 MILLIGRAM(S): 5 TABLET ORAL at 10:14

## 2021-03-26 RX ADMIN — SODIUM CHLORIDE 3 MILLILITER(S): 9 INJECTION INTRAMUSCULAR; INTRAVENOUS; SUBCUTANEOUS at 14:07

## 2021-03-26 RX ADMIN — HYDROMORPHONE HYDROCHLORIDE 4 MILLIGRAM(S): 2 INJECTION INTRAMUSCULAR; INTRAVENOUS; SUBCUTANEOUS at 19:11

## 2021-03-26 RX ADMIN — PANTOPRAZOLE SODIUM 40 MILLIGRAM(S): 20 TABLET, DELAYED RELEASE ORAL at 05:01

## 2021-03-26 RX ADMIN — SODIUM CHLORIDE 3 MILLILITER(S): 9 INJECTION INTRAMUSCULAR; INTRAVENOUS; SUBCUTANEOUS at 22:35

## 2021-03-26 RX ADMIN — HYDROMORPHONE HYDROCHLORIDE 4 MILLIGRAM(S): 2 INJECTION INTRAMUSCULAR; INTRAVENOUS; SUBCUTANEOUS at 17:31

## 2021-03-26 RX ADMIN — Medication 325 MILLIGRAM(S): at 05:01

## 2021-03-26 RX ADMIN — Medication 102 MILLIGRAM(S): at 07:50

## 2021-03-26 RX ADMIN — HYDROMORPHONE HYDROCHLORIDE 4 MILLIGRAM(S): 2 INJECTION INTRAMUSCULAR; INTRAVENOUS; SUBCUTANEOUS at 16:19

## 2021-03-26 RX ADMIN — Medication 325 MILLIGRAM(S): at 13:05

## 2021-03-26 RX ADMIN — Medication 500 MILLIGRAM(S): at 19:11

## 2021-03-26 RX ADMIN — Medication 1 TABLET(S): at 05:01

## 2021-03-26 RX ADMIN — HYDROMORPHONE HYDROCHLORIDE 4 MILLIGRAM(S): 2 INJECTION INTRAMUSCULAR; INTRAVENOUS; SUBCUTANEOUS at 22:41

## 2021-03-26 RX ADMIN — OXYCODONE HYDROCHLORIDE 10 MILLIGRAM(S): 5 TABLET ORAL at 05:30

## 2021-03-26 RX ADMIN — Medication 15 MILLIGRAM(S): at 11:31

## 2021-03-26 RX ADMIN — GABAPENTIN 100 MILLIGRAM(S): 400 CAPSULE ORAL at 05:01

## 2021-03-26 RX ADMIN — SODIUM CHLORIDE 3 MILLILITER(S): 9 INJECTION INTRAMUSCULAR; INTRAVENOUS; SUBCUTANEOUS at 05:03

## 2021-03-26 RX ADMIN — Medication 15 MILLIGRAM(S): at 05:01

## 2021-03-26 RX ADMIN — Medication 15 MILLIGRAM(S): at 10:46

## 2021-03-26 RX ADMIN — Medication 1 TABLET(S): at 22:40

## 2021-03-26 RX ADMIN — GABAPENTIN 100 MILLIGRAM(S): 400 CAPSULE ORAL at 13:10

## 2021-03-26 RX ADMIN — Medication 15 MILLIGRAM(S): at 17:31

## 2021-03-26 RX ADMIN — OXYCODONE HYDROCHLORIDE 10 MILLIGRAM(S): 5 TABLET ORAL at 14:32

## 2021-03-26 RX ADMIN — SENNA PLUS 2 TABLET(S): 8.6 TABLET ORAL at 22:40

## 2021-03-26 RX ADMIN — POLYETHYLENE GLYCOL 3350 17 GRAM(S): 17 POWDER, FOR SOLUTION ORAL at 22:40

## 2021-03-26 RX ADMIN — Medication 1 TABLET(S): at 19:56

## 2021-03-26 NOTE — PHYSICAL THERAPY INITIAL EVALUATION ADULT - ADDITIONAL COMMENTS
Pt reports that she lives in an apartment with her boyfriend with 3 steps to enter; (+)bilateral  handrails far apart; and ~13 steps to negotiate to bedroom on the 2nd floor; (+)bilateral handrails. Prior to hospital admission pt was completely independent and used a single axis cane and left foot shoe lift with ambulation. Pt denies any recent falls.    Pt left comfortable in bed, NAD, all lines intact, all precautions maintained, with call bell in reach, and RN aware of PT evaluation and pt's pain.

## 2021-03-26 NOTE — DISCHARGE NOTE NURSING/CASE MANAGEMENT/SOCIAL WORK - NSDCPNINST_GEN_ALL_CORE
You have a postop appointment with Dr Landin on 4/5/2021 @ 2:00pm, please keep postop dressing in place until this appointment.  Notify Dr Landin if you experience any increase in pain not relieved with medication, any redness, drainage or swelling around incision or any fever >100.5  Drink plenty of fluids.  No heavy lifting or straining.  Continue exercises and leg elevation as instructed.  Use over the counter stool softeners to assist with constipation which can be a side effect of narcotic pain medication.

## 2021-03-26 NOTE — PHYSICAL THERAPY INITIAL EVALUATION ADULT - PATIENT PROFILE REVIEW, REHAB EVAL
ACTIVITY: OOB to Chair; spoke with FRANCISCO James prior to PT evaluation--> Pt OK for PT consult/OOB activity./yes

## 2021-03-26 NOTE — OCCUPATIONAL THERAPY INITIAL EVALUATION ADULT - MD ORDER
Occupational Therapy (OT) to evaluate and treat. Occupational Therapy (OT) to evaluate and treat. Out of Bed to Chair. Per FRANCISCO James, pt is okay to participate in OT evaluation and perform activity as tolerated.

## 2021-03-26 NOTE — PHYSICAL THERAPY INITIAL EVALUATION ADULT - GAIT DISTANCE, PT EVAL
1 forward/backward step with right LE; unable to step with left LE 2/2 decreased weight shifting to right LE

## 2021-03-26 NOTE — PHYSICAL THERAPY INITIAL EVALUATION ADULT - PASSIVE RANGE OF MOTION EXAMINATION, REHAB EVAL
Right Knee 10-85 degrees/bilateral upper extremity Passive ROM was WFL (within functional limits)/Left LE Passive ROM was WFL (within functional limits)

## 2021-03-26 NOTE — OCCUPATIONAL THERAPY INITIAL EVALUATION ADULT - DIAGNOSIS, OT EVAL
s/p Right total knee arthroplasty; Decreased standing balance; Decreased functional mobility; Decreased ADL management

## 2021-03-26 NOTE — PHYSICAL THERAPY INITIAL EVALUATION ADULT - ACTIVE RANGE OF MOTION EXAMINATION, REHAB EVAL
Right Knee 10-30 degrees/bilateral upper extremity Active ROM was WFL (within functional limits)/Left LE Active ROM was WFL (within functional limits)

## 2021-03-26 NOTE — CONSULT NOTE ADULT - PROBLEM SELECTOR RECOMMENDATION 2
DVT ppx: ASA 325mg BID per ortho protocol  Dispo: [facility] vs. home w/ home PT depending on progression with PT

## 2021-03-26 NOTE — CONSULT NOTE ADULT - PROBLEM SELECTOR RECOMMENDATION 9
POD #1 s/p R TKA   - care per primary orthopedic team   - pain control: acetaminophen, gabapentin, dilaudid prn, tramadol prn   - bowel regimen   - PT/OT/OOB   - WBAT   - Encourage incentive spirometry

## 2021-03-26 NOTE — OCCUPATIONAL THERAPY INITIAL EVALUATION ADULT - RANGE OF MOTION EXAMINATION, LOWER EXTREMITY
Right Knee Immobilizer- donned for OOB actively secondary to pt. unable to perform Right LE straight leg raise.

## 2021-03-26 NOTE — DISCHARGE NOTE NURSING/CASE MANAGEMENT/SOCIAL WORK - PATIENT PORTAL LINK FT
You can access the FollowMyHealth Patient Portal offered by [facility] by registering at the following website: [email or web address]. By joining [facility]’s FollowMyHealth portal, you will also be able to view your health information using other applications (apps) compatible with our system.

## 2021-03-26 NOTE — OCCUPATIONAL THERAPY INITIAL EVALUATION ADULT - LIVES WITH, PROFILE
Pt. reports she lives with her boyfriend in an apartment building with 3 steps to enter. Once inside, pt. reports she has full flight of steps to negotiate to 2nd floor where apartment is located. Per pt., she has a bathtub in her bathroom with grab bar available.

## 2021-03-26 NOTE — OCCUPATIONAL THERAPY INITIAL EVALUATION ADULT - GENERAL OBSERVATIONS, REHAB EVAL
Pt. received semisupine in bed. No acute distress. Patient reports Right LE pain (RN made aware), however, pt agreed to evaluation from Occupational Therapist as tolerated. +Clean dry intact dressing to Right LE, +Heplock.

## 2021-03-26 NOTE — PHYSICAL THERAPY INITIAL EVALUATION ADULT - DIAGNOSIS, PT EVAL
Pt s/p Right total knee arthroplasty on 3/25/21; pt presents with decreased strength, decreased balance, and difficulty with ambulation.

## 2021-03-26 NOTE — OCCUPATIONAL THERAPY INITIAL EVALUATION ADULT - ASSISTIVE DEVICE FOR TRANSFER: SIT/STAND, REHAB EVAL
+Personal Left Shoe Lift secondary to hx of leg length discrepancy per pt.; Right LE Knee Immobilizer/rolling walker

## 2021-03-26 NOTE — PHYSICAL THERAPY INITIAL EVALUATION ADULT - GENERAL OBSERVATIONS, REHAB EVAL
Pt encountered in semisupine position, no distress, AxOx4, with +IV ,right knee wrapped in dressing dry/intact.

## 2021-03-26 NOTE — DISCHARGE NOTE NURSING/CASE MANAGEMENT/SOCIAL WORK - NSDCPECAREGIVERED_GEN_ALL_CORE
carenotes on knee replacement, managing pain at home, exercise worksheet, carenotes on d/c medications Medline and carenotes for surgical procedure TKR, Precautions, oxycodone, Aspirin, as well as DC Medications and side effects literature for patient reference./Yes

## 2021-03-26 NOTE — DISCHARGE NOTE NURSING/CASE MANAGEMENT/SOCIAL WORK - NSDPDISTO_GEN_ALL_CORE
stable, knee dressing in place clean dry and intact, tolerating diet, voiding well, oob with walker/Home with home care stable, knee dressing in place clean dry and intact, tolerating diet, voiding well, oob with walker, seen by PT and cleared for Dc to rehab as per safe Dc plan./Home with home care stable, knee dressing in place clean dry and intact, tolerating diet, voiding well, oob with walker, seen by PT and cleared for Dc to rehab as per safe Dc plan./Sub-Acute rehab

## 2021-03-27 PROCEDURE — 99232 SBSQ HOSP IP/OBS MODERATE 35: CPT

## 2021-03-27 RX ORDER — TRAMADOL HYDROCHLORIDE 50 MG/1
50 TABLET ORAL EVERY 6 HOURS
Refills: 0 | Status: DISCONTINUED | OUTPATIENT
Start: 2021-03-27 | End: 2021-03-27

## 2021-03-27 RX ORDER — OXYCODONE HYDROCHLORIDE 5 MG/1
10 TABLET ORAL EVERY 4 HOURS
Refills: 0 | Status: DISCONTINUED | OUTPATIENT
Start: 2021-03-27 | End: 2021-04-01

## 2021-03-27 RX ORDER — TIZANIDINE 4 MG/1
1 TABLET ORAL EVERY 6 HOURS
Refills: 0 | Status: COMPLETED | OUTPATIENT
Start: 2021-03-27 | End: 2021-03-29

## 2021-03-27 RX ORDER — OXYCODONE HYDROCHLORIDE 5 MG/1
5 TABLET ORAL EVERY 4 HOURS
Refills: 0 | Status: DISCONTINUED | OUTPATIENT
Start: 2021-03-27 | End: 2021-04-01

## 2021-03-27 RX ORDER — KETOROLAC TROMETHAMINE 30 MG/ML
30 SYRINGE (ML) INJECTION EVERY 6 HOURS
Refills: 0 | Status: DISCONTINUED | OUTPATIENT
Start: 2021-03-27 | End: 2021-03-28

## 2021-03-27 RX ADMIN — GABAPENTIN 100 MILLIGRAM(S): 400 CAPSULE ORAL at 05:50

## 2021-03-27 RX ADMIN — Medication 30 MILLIGRAM(S): at 13:20

## 2021-03-27 RX ADMIN — HYDROMORPHONE HYDROCHLORIDE 4 MILLIGRAM(S): 2 INJECTION INTRAMUSCULAR; INTRAVENOUS; SUBCUTANEOUS at 06:30

## 2021-03-27 RX ADMIN — Medication 975 MILLIGRAM(S): at 10:07

## 2021-03-27 RX ADMIN — Medication 325 MILLIGRAM(S): at 13:21

## 2021-03-27 RX ADMIN — TIZANIDINE 1 MILLIGRAM(S): 4 TABLET ORAL at 13:21

## 2021-03-27 RX ADMIN — Medication 1 TABLET(S): at 22:51

## 2021-03-27 RX ADMIN — HYDROMORPHONE HYDROCHLORIDE 4 MILLIGRAM(S): 2 INJECTION INTRAMUSCULAR; INTRAVENOUS; SUBCUTANEOUS at 02:21

## 2021-03-27 RX ADMIN — OXYCODONE HYDROCHLORIDE 10 MILLIGRAM(S): 5 TABLET ORAL at 10:07

## 2021-03-27 RX ADMIN — SODIUM CHLORIDE 3 MILLILITER(S): 9 INJECTION INTRAMUSCULAR; INTRAVENOUS; SUBCUTANEOUS at 13:21

## 2021-03-27 RX ADMIN — Medication 500 MILLIGRAM(S): at 18:33

## 2021-03-27 RX ADMIN — OXYCODONE HYDROCHLORIDE 10 MILLIGRAM(S): 5 TABLET ORAL at 11:07

## 2021-03-27 RX ADMIN — TIZANIDINE 1 MILLIGRAM(S): 4 TABLET ORAL at 18:32

## 2021-03-27 RX ADMIN — POLYETHYLENE GLYCOL 3350 17 GRAM(S): 17 POWDER, FOR SOLUTION ORAL at 22:50

## 2021-03-27 RX ADMIN — SENNA PLUS 2 TABLET(S): 8.6 TABLET ORAL at 22:49

## 2021-03-27 RX ADMIN — Medication 500 MILLIGRAM(S): at 05:50

## 2021-03-27 RX ADMIN — OXYCODONE HYDROCHLORIDE 10 MILLIGRAM(S): 5 TABLET ORAL at 19:51

## 2021-03-27 RX ADMIN — OXYCODONE HYDROCHLORIDE 10 MILLIGRAM(S): 5 TABLET ORAL at 15:20

## 2021-03-27 RX ADMIN — Medication 975 MILLIGRAM(S): at 02:21

## 2021-03-27 RX ADMIN — Medication 1 TABLET(S): at 13:21

## 2021-03-27 RX ADMIN — SODIUM CHLORIDE 3 MILLILITER(S): 9 INJECTION INTRAMUSCULAR; INTRAVENOUS; SUBCUTANEOUS at 05:49

## 2021-03-27 RX ADMIN — Medication 1 TABLET(S): at 18:40

## 2021-03-27 RX ADMIN — OXYCODONE HYDROCHLORIDE 10 MILLIGRAM(S): 5 TABLET ORAL at 20:51

## 2021-03-27 RX ADMIN — HYDROMORPHONE HYDROCHLORIDE 4 MILLIGRAM(S): 2 INJECTION INTRAMUSCULAR; INTRAVENOUS; SUBCUTANEOUS at 05:50

## 2021-03-27 RX ADMIN — PANTOPRAZOLE SODIUM 40 MILLIGRAM(S): 20 TABLET, DELAYED RELEASE ORAL at 05:50

## 2021-03-27 RX ADMIN — Medication 1 MILLIGRAM(S): at 13:21

## 2021-03-27 RX ADMIN — OXYCODONE HYDROCHLORIDE 10 MILLIGRAM(S): 5 TABLET ORAL at 14:21

## 2021-03-27 RX ADMIN — Medication 325 MILLIGRAM(S): at 18:34

## 2021-03-27 RX ADMIN — Medication 975 MILLIGRAM(S): at 18:33

## 2021-03-27 RX ADMIN — GABAPENTIN 100 MILLIGRAM(S): 400 CAPSULE ORAL at 22:49

## 2021-03-27 RX ADMIN — Medication 1 TABLET(S): at 05:50

## 2021-03-27 RX ADMIN — Medication 325 MILLIGRAM(S): at 05:50

## 2021-03-27 RX ADMIN — GABAPENTIN 100 MILLIGRAM(S): 400 CAPSULE ORAL at 13:21

## 2021-03-27 RX ADMIN — HYDROMORPHONE HYDROCHLORIDE 4 MILLIGRAM(S): 2 INJECTION INTRAMUSCULAR; INTRAVENOUS; SUBCUTANEOUS at 07:26

## 2021-03-27 RX ADMIN — Medication 30 MILLIGRAM(S): at 18:34

## 2021-03-27 RX ADMIN — SODIUM CHLORIDE 3 MILLILITER(S): 9 INJECTION INTRAMUSCULAR; INTRAVENOUS; SUBCUTANEOUS at 22:45

## 2021-03-27 NOTE — CONSULT NOTE ADULT - SUBJECTIVE AND OBJECTIVE BOX
CHIEF COMPLAINT: Patient is a 51y old  Female who presents with a chief complaint of     HPI: 51 year old female with Bilateral primary osteoarthritis of knee presenting for right total knee arthroplasty. Patient states that right knee pain has been going on for the last few years, tried conservative treatment but no relief. Patient states that her pain is severe today. She has not worked with PT yet. Her pain is controlled with current pain regimen but states it wears off after 3 hours. She states her appetite is good, eating and drinking well. Has been urinating without issues but has not had BM. Denies chest pain, SOB, fevers.      Allergies  penicillin (Anaphylaxis)    HOME MEDICATIONS: [x] Reviewed    MEDICATIONS  (STANDING):  acetaminophen   Tablet .. 975 milliGRAM(s) Oral once  acetaminophen   Tablet .. 975 milliGRAM(s) Oral every 8 hours  ascorbic acid 500 milliGRAM(s) Oral two times a day  aspirin enteric coated 325 milliGRAM(s) Oral every 12 hours  calcium carbonate 1250 mG  + Vitamin D (OsCal 500 + D) 1 Tablet(s) Oral three times a day  ferrous    sulfate 325 milliGRAM(s) Oral daily  folic acid 1 milliGRAM(s) Oral daily  gabapentin 100 milliGRAM(s) Oral three times a day  influenza   Vaccine 0.5 milliLiter(s) IntraMuscular once  ketorolac   Injectable 15 milliGRAM(s) IV Push every 6 hours  lactated ringers. 1000 milliLiter(s) (75 mL/Hr) IV Continuous <Continuous>  multivitamin 1 Tablet(s) Oral daily  pantoprazole    Tablet 40 milliGRAM(s) Oral before breakfast  polyethylene glycol 3350 17 Gram(s) Oral at bedtime  senna 2 Tablet(s) Oral at bedtime  sodium chloride 0.9% lock flush 3 milliLiter(s) IV Push every 8 hours    MEDICATIONS  (PRN):  HYDROmorphone   Tablet 4 milliGRAM(s) Oral every 3 hours PRN Severe Pain (7 - 10)  HYDROmorphone   Tablet 2 milliGRAM(s) Oral every 3 hours PRN Moderate Pain (4 - 6)  HYDROmorphone  Injectable 0.5 milliGRAM(s) IV Push once PRN breakthrough  magnesium hydroxide Suspension 30 milliLiter(s) Oral daily PRN Constipation  ondansetron Injectable 4 milliGRAM(s) IV Push every 6 hours PRN Nausea and/or Vomiting  traMADol 50 milliGRAM(s) Oral every 8 hours PRN Mild Pain (1 - 3)      PAST MEDICAL & SURGICAL HISTORY:  Adolescent idiopathic scoliosis of lumbosacral spine  1979    Fibromyalgia  2015    Kidney stone    Bilateral primary osteoarthritis of knee    H/O meniscectomy of right knee    H/O lithotripsy  2017    History of tonsillectomy    H/O bariatric surgery  sleeve 2016    History of total hip replacement, left  2005    [x ] Reviewed     SOCIAL HISTORY:  [x] Substance abuse: denies   [x] Alcohol use: denies   [x] Tobacco: denies     FAMILY HISTORY:  No pertinent family history in first degree relatives    [x] No pertinent family history in first degree relatives     REVIEW OF SYSTEMS:  REVIEW OF SYSTEMS:    CONSTITUTIONAL: No weakness, fevers or chills  EYES: No visual changes;  No vertigo   ENT: No throat pain   NECK: No pain or stiffness  RESPIRATORY: No cough, wheezing, hemoptysis; No shortness of breath  CARDIOVASCULAR: No chest pain or palpitations  GASTROINTESTINAL: No abdominal or epigastric pain. No nausea, vomiting, or hematemesis; No diarrhea or constipation. No melena or hematochezia.  GENITOURINARY: No dysuria, frequency or hematuria  NEUROLOGICAL: No numbness or weakness  SKIN: No itching, rashes  HEME: No bleeding or bruising  PSYCH: No auditory or visual hallucinations       Vital Signs Last 24 Hrs  T(C): 37 (26 Mar 2021 14:24), Max: 37.1 (25 Mar 2021 22:00)  T(F): 98.6 (26 Mar 2021 14:24), Max: 98.8 (25 Mar 2021 22:00)  HR: 95 (26 Mar 2021 14:24) (65 - 96)  BP: 126/74 (26 Mar 2021 14:24) (100/76 - 150/87)  BP(mean): 97 (25 Mar 2021 17:00) (76 - 97)  RR: 16 (26 Mar 2021 14:24) (15 - 18)  SpO2: 98% (26 Mar 2021 14:24) (98% - 100%)    PHYSICAL EXAM:  GENERAL: obese female in NAD, well-groomed, well-developed  HEAD:  Atraumatic, Normocephalic  ENMT: Moist mucous membranes  RESPIRATORY: Clear to auscultation bilaterally; No rales, rhonchi, wheezing, or rubs  CARDIOVASCULAR: Regular rate and rhythm; No murmurs, rubs, or gallops  GASTROINTESTINAL: Soft, Nontender, Nondistended; Bowel sounds present  GENITOURINARY: Not examined  EXTREMITIES:  K knee with dressing c/d/i, dorsi and plantar flexion intact. sensation intact   NERVOUS SYSTEM:  Alert & Oriented X3; No gross sensory deficits      LABS:                        13.1   8.96  )-----------( 284      ( 26 Mar 2021 06:24 )             39.6     Hemoglobin: 13.1 g/dL (03-26 @ 06:24)  Hemoglobin: 13.1 g/dL (03-25 @ 11:01)    03-26    137  |  102  |  13  ----------------------------<  172<H>  3.9   |  23  |  0.43<L>    Ca    9.7      26 Mar 2021 06:24          Microbiology     RADIOLOGY & ADDITIONAL STUDIES:    EKG:   Personally Reviewed:  [x] YES     Imaging:   Personally Reviewed:  [x] YES               [ ] Consultant(s) Notes Reviewed  [x] Care Discussed with Consultants/Other Providers: Ortho PA - discussed 
Patient is a 51y old  Female who presents with a chief complaint of s/p surgery (27 Mar 2021 13:01)      HPI:      PAST MEDICAL & SURGICAL HISTORY:  Adolescent idiopathic scoliosis of lumbosacral spine  1979    Fibromyalgia  2015    Kidney stone    Bilateral primary osteoarthritis of knee    H/O meniscectomy of right knee    H/O lithotripsy  2017    History of tonsillectomy    H/O bariatric surgery  sleeve 2016    History of total hip replacement, left  2005        MEDICATIONS  (STANDING):  acetaminophen   Tablet .. 975 milliGRAM(s) Oral every 8 hours  ascorbic acid 500 milliGRAM(s) Oral two times a day  aspirin enteric coated 325 milliGRAM(s) Oral every 12 hours  calcium carbonate 1250 mG  + Vitamin D (OsCal 500 + D) 1 Tablet(s) Oral three times a day  ferrous    sulfate 325 milliGRAM(s) Oral daily  folic acid 1 milliGRAM(s) Oral daily  gabapentin 100 milliGRAM(s) Oral three times a day  influenza   Vaccine 0.5 milliLiter(s) IntraMuscular once  ketorolac   Injectable 30 milliGRAM(s) IV Push every 6 hours  lactated ringers. 1000 milliLiter(s) (75 mL/Hr) IV Continuous <Continuous>  multivitamin 1 Tablet(s) Oral daily  pantoprazole    Tablet 40 milliGRAM(s) Oral before breakfast  polyethylene glycol 3350 17 Gram(s) Oral at bedtime  senna 2 Tablet(s) Oral at bedtime  sodium chloride 0.9% lock flush 3 milliLiter(s) IV Push every 8 hours  tiZANidine 1 milliGRAM(s) Oral every 6 hours    MEDICATIONS  (PRN):  calcium carbonate    500 mG (Tums) Chewable 1 Tablet(s) Chew every 6 hours PRN Heartburn  HYDROmorphone  Injectable 0.5 milliGRAM(s) IV Push once PRN breakthrough  magnesium hydroxide Suspension 30 milliLiter(s) Oral daily PRN Constipation  ondansetron Injectable 4 milliGRAM(s) IV Push every 6 hours PRN Nausea and/or Vomiting  oxyCODONE    IR 5 milliGRAM(s) Oral every 4 hours PRN Moderate Pain (4 - 6)  oxyCODONE    IR 10 milliGRAM(s) Oral every 4 hours PRN Severe Pain (7 - 10)      ICU Vital Signs Last 24 Hrs  T(C): 36.9 (27 Mar 2021 13:18), Max: 37 (26 Mar 2021 14:24)  T(F): 98.4 (27 Mar 2021 13:18), Max: 98.6 (26 Mar 2021 14:24)  HR: 99 (27 Mar 2021 13:18) (78 - 100)  BP: 141/76 (27 Mar 2021 13:18) (113/75 - 141/76)  BP(mean): --  ABP: --  ABP(mean): --  RR: 17 (27 Mar 2021 13:18) (16 - 18)  SpO2: 98% (27 Mar 2021 13:18) (96% - 98%)      Vital Signs Last 24 Hrs  T(C): 36.9 (27 Mar 2021 13:18), Max: 37 (26 Mar 2021 14:24)  T(F): 98.4 (27 Mar 2021 13:18), Max: 98.6 (26 Mar 2021 14:24)  HR: 99 (27 Mar 2021 13:18) (78 - 100)  BP: 141/76 (27 Mar 2021 13:18) (113/75 - 141/76)  BP(mean): --  RR: 17 (27 Mar 2021 13:18) (16 - 18)  SpO2: 98% (27 Mar 2021 13:18) (96% - 98%)                          13.1   8.96  )-----------( 284      ( 26 Mar 2021 06:24 )             39.6                 COMMENTS/PLAN: Pt. complaining of pain 6/10 on right thigh, states pain has gotten worse after ambulating with PT. Also states dilaudid has not helped for her in the past. Pt. requesting to increase oxycodone to help with pain. Pt. states she has a history of opioid use over a year ago for surgeries but hasn't taken anything since. Discussed with patient regimen which she is agreeable too at this time. Pt. A&Ox3, NAD, laying in bed with right leg elevated.     Recommendations  1. Continue current regimen of oxycodone  2. Tizanidine 1 mg q6hrs standing for 2 days. Hold for oversedation.   3. Consider increasing gabapentin to 400mg if still in discomfort   4. Consider lidoderm patch to effected area.    5. Pain service not recommending an increase in opioids at this time.

## 2021-03-28 LAB
ANION GAP SERPL CALC-SCNC: 11 MMOL/L — SIGNIFICANT CHANGE UP (ref 7–14)
BUN SERPL-MCNC: 23 MG/DL — SIGNIFICANT CHANGE UP (ref 7–23)
CALCIUM SERPL-MCNC: 9.4 MG/DL — SIGNIFICANT CHANGE UP (ref 8.4–10.5)
CHLORIDE SERPL-SCNC: 101 MMOL/L — SIGNIFICANT CHANGE UP (ref 98–107)
CO2 SERPL-SCNC: 26 MMOL/L — SIGNIFICANT CHANGE UP (ref 22–31)
CREAT SERPL-MCNC: 0.55 MG/DL — SIGNIFICANT CHANGE UP (ref 0.5–1.3)
GLUCOSE SERPL-MCNC: 80 MG/DL — SIGNIFICANT CHANGE UP (ref 70–99)
HCT VFR BLD CALC: 33.5 % — LOW (ref 34.5–45)
HGB BLD-MCNC: 10.8 G/DL — LOW (ref 11.5–15.5)
MCHC RBC-ENTMCNC: 30.7 PG — SIGNIFICANT CHANGE UP (ref 27–34)
MCHC RBC-ENTMCNC: 32.2 GM/DL — SIGNIFICANT CHANGE UP (ref 32–36)
MCV RBC AUTO: 95.2 FL — SIGNIFICANT CHANGE UP (ref 80–100)
NRBC # BLD: 0 /100 WBCS — SIGNIFICANT CHANGE UP
NRBC # FLD: 0 K/UL — SIGNIFICANT CHANGE UP
PLATELET # BLD AUTO: 250 K/UL — SIGNIFICANT CHANGE UP (ref 150–400)
POTASSIUM SERPL-MCNC: 3.8 MMOL/L — SIGNIFICANT CHANGE UP (ref 3.5–5.3)
POTASSIUM SERPL-SCNC: 3.8 MMOL/L — SIGNIFICANT CHANGE UP (ref 3.5–5.3)
RBC # BLD: 3.52 M/UL — LOW (ref 3.8–5.2)
RBC # FLD: 13.1 % — SIGNIFICANT CHANGE UP (ref 10.3–14.5)
SODIUM SERPL-SCNC: 138 MMOL/L — SIGNIFICANT CHANGE UP (ref 135–145)
WBC # BLD: 7.56 K/UL — SIGNIFICANT CHANGE UP (ref 3.8–10.5)
WBC # FLD AUTO: 7.56 K/UL — SIGNIFICANT CHANGE UP (ref 3.8–10.5)

## 2021-03-28 PROCEDURE — 99232 SBSQ HOSP IP/OBS MODERATE 35: CPT

## 2021-03-28 RX ADMIN — Medication 325 MILLIGRAM(S): at 13:04

## 2021-03-28 RX ADMIN — Medication 1 MILLIGRAM(S): at 13:06

## 2021-03-28 RX ADMIN — Medication 325 MILLIGRAM(S): at 06:15

## 2021-03-28 RX ADMIN — Medication 975 MILLIGRAM(S): at 14:10

## 2021-03-28 RX ADMIN — Medication 975 MILLIGRAM(S): at 18:26

## 2021-03-28 RX ADMIN — TIZANIDINE 1 MILLIGRAM(S): 4 TABLET ORAL at 08:09

## 2021-03-28 RX ADMIN — Medication 1 TABLET(S): at 21:09

## 2021-03-28 RX ADMIN — Medication 325 MILLIGRAM(S): at 18:26

## 2021-03-28 RX ADMIN — OXYCODONE HYDROCHLORIDE 10 MILLIGRAM(S): 5 TABLET ORAL at 22:00

## 2021-03-28 RX ADMIN — OXYCODONE HYDROCHLORIDE 10 MILLIGRAM(S): 5 TABLET ORAL at 16:13

## 2021-03-28 RX ADMIN — Medication 500 MILLIGRAM(S): at 06:16

## 2021-03-28 RX ADMIN — OXYCODONE HYDROCHLORIDE 10 MILLIGRAM(S): 5 TABLET ORAL at 21:09

## 2021-03-28 RX ADMIN — OXYCODONE HYDROCHLORIDE 10 MILLIGRAM(S): 5 TABLET ORAL at 00:02

## 2021-03-28 RX ADMIN — OXYCODONE HYDROCHLORIDE 10 MILLIGRAM(S): 5 TABLET ORAL at 06:16

## 2021-03-28 RX ADMIN — POLYETHYLENE GLYCOL 3350 17 GRAM(S): 17 POWDER, FOR SOLUTION ORAL at 21:09

## 2021-03-28 RX ADMIN — GABAPENTIN 100 MILLIGRAM(S): 400 CAPSULE ORAL at 14:16

## 2021-03-28 RX ADMIN — Medication 1 TABLET(S): at 13:04

## 2021-03-28 RX ADMIN — SODIUM CHLORIDE 3 MILLILITER(S): 9 INJECTION INTRAMUSCULAR; INTRAVENOUS; SUBCUTANEOUS at 14:10

## 2021-03-28 RX ADMIN — SENNA PLUS 2 TABLET(S): 8.6 TABLET ORAL at 21:09

## 2021-03-28 RX ADMIN — Medication 1 TABLET(S): at 06:18

## 2021-03-28 RX ADMIN — OXYCODONE HYDROCHLORIDE 10 MILLIGRAM(S): 5 TABLET ORAL at 01:02

## 2021-03-28 RX ADMIN — TIZANIDINE 1 MILLIGRAM(S): 4 TABLET ORAL at 18:27

## 2021-03-28 RX ADMIN — GABAPENTIN 100 MILLIGRAM(S): 400 CAPSULE ORAL at 21:09

## 2021-03-28 RX ADMIN — Medication 975 MILLIGRAM(S): at 13:04

## 2021-03-28 RX ADMIN — OXYCODONE HYDROCHLORIDE 10 MILLIGRAM(S): 5 TABLET ORAL at 10:16

## 2021-03-28 RX ADMIN — OXYCODONE HYDROCHLORIDE 10 MILLIGRAM(S): 5 TABLET ORAL at 17:29

## 2021-03-28 RX ADMIN — OXYCODONE HYDROCHLORIDE 10 MILLIGRAM(S): 5 TABLET ORAL at 11:21

## 2021-03-28 RX ADMIN — GABAPENTIN 100 MILLIGRAM(S): 400 CAPSULE ORAL at 06:15

## 2021-03-28 RX ADMIN — Medication 30 MILLIGRAM(S): at 06:16

## 2021-03-28 RX ADMIN — Medication 30 MILLIGRAM(S): at 00:02

## 2021-03-28 RX ADMIN — TIZANIDINE 1 MILLIGRAM(S): 4 TABLET ORAL at 13:05

## 2021-03-28 RX ADMIN — SODIUM CHLORIDE 3 MILLILITER(S): 9 INJECTION INTRAMUSCULAR; INTRAVENOUS; SUBCUTANEOUS at 06:08

## 2021-03-28 RX ADMIN — Medication 500 MILLIGRAM(S): at 18:27

## 2021-03-28 RX ADMIN — SODIUM CHLORIDE 3 MILLILITER(S): 9 INJECTION INTRAMUSCULAR; INTRAVENOUS; SUBCUTANEOUS at 21:04

## 2021-03-28 RX ADMIN — TIZANIDINE 1 MILLIGRAM(S): 4 TABLET ORAL at 01:27

## 2021-03-28 RX ADMIN — Medication 975 MILLIGRAM(S): at 18:29

## 2021-03-28 RX ADMIN — TIZANIDINE 1 MILLIGRAM(S): 4 TABLET ORAL at 23:45

## 2021-03-28 RX ADMIN — PANTOPRAZOLE SODIUM 40 MILLIGRAM(S): 20 TABLET, DELAYED RELEASE ORAL at 06:15

## 2021-03-28 RX ADMIN — Medication 1 TABLET(S): at 14:16

## 2021-03-29 ENCOUNTER — TRANSCRIPTION ENCOUNTER (OUTPATIENT)
Age: 51
End: 2021-03-29

## 2021-03-29 PROCEDURE — 99232 SBSQ HOSP IP/OBS MODERATE 35: CPT

## 2021-03-29 RX ORDER — DOCUSATE SODIUM 100 MG
1 CAPSULE ORAL
Qty: 0 | Refills: 0 | DISCHARGE

## 2021-03-29 RX ORDER — SENNA PLUS 8.6 MG/1
2 TABLET ORAL
Qty: 0 | Refills: 0 | DISCHARGE
Start: 2021-03-29

## 2021-03-29 RX ORDER — ASCORBIC ACID 60 MG
1 TABLET,CHEWABLE ORAL
Qty: 0 | Refills: 0 | DISCHARGE
Start: 2021-03-29

## 2021-03-29 RX ORDER — PANTOPRAZOLE SODIUM 20 MG/1
1 TABLET, DELAYED RELEASE ORAL
Qty: 0 | Refills: 0 | DISCHARGE
Start: 2021-03-29

## 2021-03-29 RX ORDER — FERROUS SULFATE 325(65) MG
1 TABLET ORAL
Qty: 0 | Refills: 0 | DISCHARGE
Start: 2021-03-29

## 2021-03-29 RX ADMIN — Medication 1 TABLET(S): at 11:39

## 2021-03-29 RX ADMIN — SODIUM CHLORIDE 3 MILLILITER(S): 9 INJECTION INTRAMUSCULAR; INTRAVENOUS; SUBCUTANEOUS at 21:55

## 2021-03-29 RX ADMIN — Medication 1 TABLET(S): at 21:55

## 2021-03-29 RX ADMIN — TIZANIDINE 1 MILLIGRAM(S): 4 TABLET ORAL at 07:41

## 2021-03-29 RX ADMIN — TIZANIDINE 1 MILLIGRAM(S): 4 TABLET ORAL at 13:06

## 2021-03-29 RX ADMIN — OXYCODONE HYDROCHLORIDE 10 MILLIGRAM(S): 5 TABLET ORAL at 05:42

## 2021-03-29 RX ADMIN — OXYCODONE HYDROCHLORIDE 10 MILLIGRAM(S): 5 TABLET ORAL at 09:47

## 2021-03-29 RX ADMIN — OXYCODONE HYDROCHLORIDE 10 MILLIGRAM(S): 5 TABLET ORAL at 17:51

## 2021-03-29 RX ADMIN — Medication 500 MILLIGRAM(S): at 17:50

## 2021-03-29 RX ADMIN — Medication 1 TABLET(S): at 05:42

## 2021-03-29 RX ADMIN — PANTOPRAZOLE SODIUM 40 MILLIGRAM(S): 20 TABLET, DELAYED RELEASE ORAL at 07:07

## 2021-03-29 RX ADMIN — Medication 325 MILLIGRAM(S): at 17:50

## 2021-03-29 RX ADMIN — OXYCODONE HYDROCHLORIDE 10 MILLIGRAM(S): 5 TABLET ORAL at 18:20

## 2021-03-29 RX ADMIN — SODIUM CHLORIDE 3 MILLILITER(S): 9 INJECTION INTRAMUSCULAR; INTRAVENOUS; SUBCUTANEOUS at 05:42

## 2021-03-29 RX ADMIN — OXYCODONE HYDROCHLORIDE 10 MILLIGRAM(S): 5 TABLET ORAL at 14:15

## 2021-03-29 RX ADMIN — Medication 325 MILLIGRAM(S): at 05:42

## 2021-03-29 RX ADMIN — Medication 975 MILLIGRAM(S): at 17:51

## 2021-03-29 RX ADMIN — SODIUM CHLORIDE 3 MILLILITER(S): 9 INJECTION INTRAMUSCULAR; INTRAVENOUS; SUBCUTANEOUS at 13:05

## 2021-03-29 RX ADMIN — GABAPENTIN 100 MILLIGRAM(S): 400 CAPSULE ORAL at 13:05

## 2021-03-29 RX ADMIN — OXYCODONE HYDROCHLORIDE 10 MILLIGRAM(S): 5 TABLET ORAL at 01:09

## 2021-03-29 RX ADMIN — OXYCODONE HYDROCHLORIDE 10 MILLIGRAM(S): 5 TABLET ORAL at 21:55

## 2021-03-29 RX ADMIN — Medication 975 MILLIGRAM(S): at 11:38

## 2021-03-29 RX ADMIN — OXYCODONE HYDROCHLORIDE 10 MILLIGRAM(S): 5 TABLET ORAL at 10:30

## 2021-03-29 RX ADMIN — SENNA PLUS 2 TABLET(S): 8.6 TABLET ORAL at 21:55

## 2021-03-29 RX ADMIN — Medication 1 TABLET(S): at 13:03

## 2021-03-29 RX ADMIN — GABAPENTIN 100 MILLIGRAM(S): 400 CAPSULE ORAL at 05:42

## 2021-03-29 RX ADMIN — OXYCODONE HYDROCHLORIDE 10 MILLIGRAM(S): 5 TABLET ORAL at 13:46

## 2021-03-29 RX ADMIN — OXYCODONE HYDROCHLORIDE 10 MILLIGRAM(S): 5 TABLET ORAL at 22:45

## 2021-03-29 RX ADMIN — Medication 500 MILLIGRAM(S): at 05:42

## 2021-03-29 RX ADMIN — POLYETHYLENE GLYCOL 3350 17 GRAM(S): 17 POWDER, FOR SOLUTION ORAL at 21:55

## 2021-03-29 RX ADMIN — GABAPENTIN 100 MILLIGRAM(S): 400 CAPSULE ORAL at 21:55

## 2021-03-29 RX ADMIN — OXYCODONE HYDROCHLORIDE 10 MILLIGRAM(S): 5 TABLET ORAL at 02:00

## 2021-03-29 RX ADMIN — OXYCODONE HYDROCHLORIDE 10 MILLIGRAM(S): 5 TABLET ORAL at 06:30

## 2021-03-29 NOTE — DISCHARGE NOTE PROVIDER - NSDCCPTREATMENT_GEN_ALL_CORE_FT
PRINCIPAL PROCEDURE  Procedure: Right total knee arthroplasty  Findings and Treatment: dictated operative note  weight bearing as tolerated to Right leg in [place] brace when ambulating  call Surgeon's office to make appointment for 1-2 weeks from date of surgery Dr. Landin [phone]

## 2021-03-29 NOTE — DISCHARGE NOTE PROVIDER - NSDCCPCAREPLAN_GEN_ALL_CORE_FT
PRINCIPAL DISCHARGE DIAGNOSIS  Diagnosis: Primary osteoarthritis of right knee  Assessment and Plan of Treatment: 51year old female is admitted for elective Right total knee arthroplasty 3/25/2021 by Dr. Nasir Landin without any intraoperative complications.  Patient is doing well and stable for discharge.  Patient is tolerating physical therapy: weight bearing to Right leg, gait training with [place] brace on for stability while ambulating, exercises as shown by Physical Therapy.  Keep wound dressing on as is until day of office visit.  Staples/sutures if applicable, to be removed in the office 14 days from date of surgery.  Patient is on Aspirin (MUST TAKE WITH FOOD) for anticoagulation.  Orthopaedic Surgeon Dr. Landin would like patient to call private MD/Internist for appointment postop to maintain continuity of care.  Follow up with Dr. Landin's office in 1-2 weeks.       PRINCIPAL DISCHARGE DIAGNOSIS  Diagnosis: Primary osteoarthritis of right knee  Assessment and Plan of Treatment: 51year old female is admitted for elective Right total knee arthroplasty 3/25/2021 by Dr. Nasir Landin without any intraoperative complications.  Patient is doing well and stable for discharge.  Patient is tolerating physical therapy: weight bearing to Right leg, gait training (patient had [place] initially postop as patient was unable to straight leg raise, now patient is fully able to straight leg raise), exercises as shown by Physical Therapy.  Keep wound Mepilex dressing on as is until day of office visit.  Staples/sutures if applicable, to be removed in the office 14 days from date of surgery.  Patient is on Aspirin (MUST TAKE WITH FOOD) for anticoagulation.  Orthopaedic Surgeon Dr. Landin would like patient to call private MD/Internist for appointment postop to maintain continuity of care.  Follow up with Dr. Landin's office in 1-2 weeks.

## 2021-03-29 NOTE — DISCHARGE NOTE PROVIDER - HOSPITAL COURSE
51year old female is admitted for elective Right total knee arthroplasty 3/25/2021 by Dr. Nasir Landin without any intraoperative complications.  Patient is doing well and stable for discharge.  Patient is tolerating physical therapy: weight bearing to Right leg, gait training with [place] brace on for stability while ambulating, exercises as shown by Physical Therapy.  Keep wound dressing on as is until day of office visit.  Staples/sutures if applicable, to be removed in the office 14 days from date of surgery.  Patient is on Aspirin (MUST TAKE WITH FOOD) for anticoagulation.  Orthopaedic Surgeon Dr. Landin would like patient to call private MD/Internist for appointment postop to maintain continuity of care.  Follow up with Dr. Landin's office in 1-2 weeks.   51year old female is admitted for elective Right total knee arthroplasty 3/25/2021 by Dr. Nasir Landin without any intraoperative complications.  Patient is doing well and stable for discharge.  Patient is tolerating physical therapy: weight bearing to Right leg, gait training (patient had [place] initially postop as patient was unable to straight leg raise, now patient is fully able to straight leg raise), exercises as shown by Physical Therapy.  Keep wound Mepilex dressing on as is until day of office visit.  Staples/sutures if applicable, to be removed in the office 14 days from date of surgery.  Patient is on Aspirin (MUST TAKE WITH FOOD) for anticoagulation.  Orthopaedic Surgeon Dr. Landin would like patient to call private MD/Internist for appointment postop to maintain continuity of care.  Follow up with Dr. Landin's office in 1-2 weeks.

## 2021-03-29 NOTE — PROVIDER CONTACT NOTE (OTHER) - ASSESSMENT
Upon entry to room pt. brace was removed & pt. refused to reapply. Pt. c/o discomfort & pruritis upper inner thigh area underneath where liliana brace was. Skin in that area is reddened & bumpy.
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
## 2021-03-29 NOTE — PROVIDER CONTACT NOTE (OTHER) - ACTION/TREATMENT ORDERED:
MD Navarrete made aware; MD Navarrete says OK for pt. to not reapply brace. No further orders. Will continue to monitor.
WILLIE Hill to evaluate pt bedside.

## 2021-03-29 NOTE — DISCHARGE NOTE PROVIDER - CARE PROVIDER_API CALL
Nasir Landin)  [address], [facility] [address]  [place] [address]  Phone: [phone]  Fax: [phone]  Established Patient  Follow Up Time: 2 weeks

## 2021-03-29 NOTE — DISCHARGE NOTE PROVIDER - NSDCMRMEDTOKEN_GEN_ALL_CORE_FT
ascorbic acid 500 mg oral tablet: 1 tab(s) orally 2 times a day  calcium-vitamin D 500 mg-200 intl units (5 mcg) oral tablet: 1 tab(s) orally 3 times a day  Colace 100 mg oral capsule: 1 cap(s) orally 2 times a day  discharge home with over the counter for constipation caused by pain meds  Duexis 800 mg-26.6 mg oral tablet: 1 tab(s) orally 3 times a day for pain last day 3/17/21  ferrous sulfate 325 mg (65 mg elemental iron) oral tablet: 1 tab(s) orally once a day  pantoprazole 40 mg oral delayed release tablet: 1 tab(s) orally once a day (before a meal)  senna oral tablet: 2 tab(s) orally once a day (at bedtime)  discharge home with over the counter for constipation caused by pain meds   acetaminophen 325 mg oral tablet: 3 tab(s) orally every 8 hours as needed for Mild pain  ascorbic acid 500 mg oral tablet: 1 tab(s) orally 2 times a day  aspirin 325 mg oral delayed release tablet: 1 tab(s) orally 2 times a day (with meals)  calcium-vitamin D 500 mg-200 intl units (5 mcg) oral tablet: 1 tab(s) orally 3 times a day  Colace 100 mg oral capsule: 1 cap(s) orally 2 times a day  discharge home with over the counter for constipation caused by pain meds  ferrous sulfate 325 mg (65 mg elemental iron) oral tablet: 1 tab(s) orally once a day  gabapentin 100 mg oral capsule: 1 cap(s) orally 3 times a day  begin tapering off as pain decreases   oxyCODONE 10 mg oral tablet: 1 tab(s) orally every 4 hours, As needed, Severe Pain (7 - 10)  begin tapering off as pain decreases   oxyCODONE 5 mg oral tablet: 1 tab(s) orally every 4 hours, As needed, Moderate Pain (4 - 6)  pantoprazole 40 mg oral delayed release tablet: 1 tab(s) orally once a day (before a meal)  senna oral tablet: 2 tab(s) orally once a day (at bedtime)  discharge home with over the counter for constipation caused by pain meds

## 2021-03-29 NOTE — PROVIDER CONTACT NOTE (OTHER) - ASSESSMENT
Legs warm to touch, +capillary refill, able to wiggle toes, able to plantar/dorsi flex, +sensation. Unable to locate dorsalis pedal pulse via palpation and doppler. +2 via doppler on posterior tibial pulses b/l.

## 2021-03-29 NOTE — PROVIDER CONTACT NOTE (OTHER) - REASON
Pt. refusing to wear liliana brace; pt. c/o pruritis & discomfort on upper inner thigh underneath brace

## 2021-03-30 LAB
SARS-COV-2 RNA SPEC QL NAA+PROBE: SIGNIFICANT CHANGE UP
SURGICAL PATHOLOGY STUDY: SIGNIFICANT CHANGE UP

## 2021-03-30 PROCEDURE — 99232 SBSQ HOSP IP/OBS MODERATE 35: CPT

## 2021-03-30 RX ADMIN — Medication 500 MILLIGRAM(S): at 18:02

## 2021-03-30 RX ADMIN — SODIUM CHLORIDE 3 MILLILITER(S): 9 INJECTION INTRAMUSCULAR; INTRAVENOUS; SUBCUTANEOUS at 15:57

## 2021-03-30 RX ADMIN — OXYCODONE HYDROCHLORIDE 10 MILLIGRAM(S): 5 TABLET ORAL at 08:44

## 2021-03-30 RX ADMIN — Medication 975 MILLIGRAM(S): at 11:06

## 2021-03-30 RX ADMIN — OXYCODONE HYDROCHLORIDE 10 MILLIGRAM(S): 5 TABLET ORAL at 18:30

## 2021-03-30 RX ADMIN — Medication 1 TABLET(S): at 12:02

## 2021-03-30 RX ADMIN — SODIUM CHLORIDE 3 MILLILITER(S): 9 INJECTION INTRAMUSCULAR; INTRAVENOUS; SUBCUTANEOUS at 21:31

## 2021-03-30 RX ADMIN — Medication 325 MILLIGRAM(S): at 18:02

## 2021-03-30 RX ADMIN — GABAPENTIN 100 MILLIGRAM(S): 400 CAPSULE ORAL at 14:09

## 2021-03-30 RX ADMIN — OXYCODONE HYDROCHLORIDE 10 MILLIGRAM(S): 5 TABLET ORAL at 03:55

## 2021-03-30 RX ADMIN — OXYCODONE HYDROCHLORIDE 10 MILLIGRAM(S): 5 TABLET ORAL at 04:55

## 2021-03-30 RX ADMIN — SODIUM CHLORIDE 3 MILLILITER(S): 9 INJECTION INTRAMUSCULAR; INTRAVENOUS; SUBCUTANEOUS at 05:09

## 2021-03-30 RX ADMIN — OXYCODONE HYDROCHLORIDE 10 MILLIGRAM(S): 5 TABLET ORAL at 23:04

## 2021-03-30 RX ADMIN — OXYCODONE HYDROCHLORIDE 10 MILLIGRAM(S): 5 TABLET ORAL at 22:17

## 2021-03-30 RX ADMIN — GABAPENTIN 100 MILLIGRAM(S): 400 CAPSULE ORAL at 21:34

## 2021-03-30 RX ADMIN — OXYCODONE HYDROCHLORIDE 10 MILLIGRAM(S): 5 TABLET ORAL at 18:02

## 2021-03-30 RX ADMIN — OXYCODONE HYDROCHLORIDE 10 MILLIGRAM(S): 5 TABLET ORAL at 13:00

## 2021-03-30 RX ADMIN — OXYCODONE HYDROCHLORIDE 10 MILLIGRAM(S): 5 TABLET ORAL at 08:01

## 2021-03-30 RX ADMIN — Medication 1 TABLET(S): at 14:08

## 2021-03-30 RX ADMIN — POLYETHYLENE GLYCOL 3350 17 GRAM(S): 17 POWDER, FOR SOLUTION ORAL at 21:34

## 2021-03-30 RX ADMIN — Medication 1 TABLET(S): at 05:09

## 2021-03-30 RX ADMIN — SENNA PLUS 2 TABLET(S): 8.6 TABLET ORAL at 21:34

## 2021-03-30 RX ADMIN — Medication 325 MILLIGRAM(S): at 05:09

## 2021-03-30 RX ADMIN — Medication 500 MILLIGRAM(S): at 05:09

## 2021-03-30 RX ADMIN — OXYCODONE HYDROCHLORIDE 10 MILLIGRAM(S): 5 TABLET ORAL at 11:57

## 2021-03-30 RX ADMIN — GABAPENTIN 100 MILLIGRAM(S): 400 CAPSULE ORAL at 05:09

## 2021-03-30 RX ADMIN — Medication 1 TABLET(S): at 21:34

## 2021-03-30 RX ADMIN — Medication 975 MILLIGRAM(S): at 18:03

## 2021-03-31 PROCEDURE — 99232 SBSQ HOSP IP/OBS MODERATE 35: CPT

## 2021-03-31 RX ADMIN — OXYCODONE HYDROCHLORIDE 10 MILLIGRAM(S): 5 TABLET ORAL at 14:00

## 2021-03-31 RX ADMIN — Medication 325 MILLIGRAM(S): at 11:33

## 2021-03-31 RX ADMIN — Medication 975 MILLIGRAM(S): at 11:33

## 2021-03-31 RX ADMIN — GABAPENTIN 100 MILLIGRAM(S): 400 CAPSULE ORAL at 05:14

## 2021-03-31 RX ADMIN — Medication 975 MILLIGRAM(S): at 02:42

## 2021-03-31 RX ADMIN — OXYCODONE HYDROCHLORIDE 10 MILLIGRAM(S): 5 TABLET ORAL at 03:20

## 2021-03-31 RX ADMIN — Medication 1 TABLET(S): at 21:51

## 2021-03-31 RX ADMIN — OXYCODONE HYDROCHLORIDE 10 MILLIGRAM(S): 5 TABLET ORAL at 09:53

## 2021-03-31 RX ADMIN — Medication 500 MILLIGRAM(S): at 19:00

## 2021-03-31 RX ADMIN — OXYCODONE HYDROCHLORIDE 10 MILLIGRAM(S): 5 TABLET ORAL at 08:52

## 2021-03-31 RX ADMIN — OXYCODONE HYDROCHLORIDE 10 MILLIGRAM(S): 5 TABLET ORAL at 20:02

## 2021-03-31 RX ADMIN — Medication 975 MILLIGRAM(S): at 19:00

## 2021-03-31 RX ADMIN — Medication 325 MILLIGRAM(S): at 19:00

## 2021-03-31 RX ADMIN — Medication 1 TABLET(S): at 14:00

## 2021-03-31 RX ADMIN — GABAPENTIN 100 MILLIGRAM(S): 400 CAPSULE ORAL at 21:51

## 2021-03-31 RX ADMIN — PANTOPRAZOLE SODIUM 40 MILLIGRAM(S): 20 TABLET, DELAYED RELEASE ORAL at 05:14

## 2021-03-31 RX ADMIN — OXYCODONE HYDROCHLORIDE 10 MILLIGRAM(S): 5 TABLET ORAL at 02:42

## 2021-03-31 RX ADMIN — Medication 1 TABLET(S): at 05:14

## 2021-03-31 RX ADMIN — Medication 325 MILLIGRAM(S): at 05:13

## 2021-03-31 RX ADMIN — OXYCODONE HYDROCHLORIDE 10 MILLIGRAM(S): 5 TABLET ORAL at 19:00

## 2021-03-31 RX ADMIN — Medication 1 TABLET(S): at 11:33

## 2021-03-31 RX ADMIN — SODIUM CHLORIDE 3 MILLILITER(S): 9 INJECTION INTRAMUSCULAR; INTRAVENOUS; SUBCUTANEOUS at 05:12

## 2021-03-31 RX ADMIN — SENNA PLUS 2 TABLET(S): 8.6 TABLET ORAL at 21:51

## 2021-03-31 RX ADMIN — GABAPENTIN 100 MILLIGRAM(S): 400 CAPSULE ORAL at 14:00

## 2021-03-31 RX ADMIN — Medication 500 MILLIGRAM(S): at 05:13

## 2021-03-31 RX ADMIN — OXYCODONE HYDROCHLORIDE 10 MILLIGRAM(S): 5 TABLET ORAL at 23:15

## 2021-04-01 VITALS
RESPIRATION RATE: 16 BRPM | SYSTOLIC BLOOD PRESSURE: 117 MMHG | OXYGEN SATURATION: 98 % | HEART RATE: 81 BPM | DIASTOLIC BLOOD PRESSURE: 64 MMHG | TEMPERATURE: 98 F

## 2021-04-01 PROCEDURE — 99233 SBSQ HOSP IP/OBS HIGH 50: CPT

## 2021-04-01 RX ORDER — ACETAMINOPHEN 500 MG
3 TABLET ORAL
Qty: 0 | Refills: 0 | DISCHARGE
Start: 2021-04-01

## 2021-04-01 RX ORDER — GABAPENTIN 400 MG/1
1 CAPSULE ORAL
Qty: 0 | Refills: 0 | DISCHARGE
Start: 2021-04-01

## 2021-04-01 RX ORDER — ASPIRIN/CALCIUM CARB/MAGNESIUM 324 MG
1 TABLET ORAL
Qty: 0 | Refills: 0 | DISCHARGE
Start: 2021-04-01

## 2021-04-01 RX ORDER — OXYCODONE HYDROCHLORIDE 5 MG/1
1 TABLET ORAL
Qty: 0 | Refills: 0 | DISCHARGE
Start: 2021-04-01

## 2021-04-01 RX ORDER — IBUPROFEN AND FAMOTIDINE 26.6; 8 MG/1; MG/1
1 TABLET, FILM COATED ORAL
Qty: 0 | Refills: 0 | DISCHARGE

## 2021-04-01 RX ADMIN — Medication 1 TABLET(S): at 05:09

## 2021-04-01 RX ADMIN — OXYCODONE HYDROCHLORIDE 10 MILLIGRAM(S): 5 TABLET ORAL at 10:37

## 2021-04-01 RX ADMIN — OXYCODONE HYDROCHLORIDE 10 MILLIGRAM(S): 5 TABLET ORAL at 03:31

## 2021-04-01 RX ADMIN — Medication 500 MILLIGRAM(S): at 05:09

## 2021-04-01 RX ADMIN — Medication 975 MILLIGRAM(S): at 03:31

## 2021-04-01 RX ADMIN — Medication 325 MILLIGRAM(S): at 05:09

## 2021-04-01 RX ADMIN — GABAPENTIN 100 MILLIGRAM(S): 400 CAPSULE ORAL at 05:09

## 2021-04-01 RX ADMIN — OXYCODONE HYDROCHLORIDE 10 MILLIGRAM(S): 5 TABLET ORAL at 00:15

## 2021-04-01 NOTE — PROGRESS NOTE ADULT - PROBLEM SELECTOR PLAN 2
DVT ppx: ASA 325mg BID per ortho protocol
DVT ppx: ASA 325mg BID per ortho protocol  Dispo: [facility] vs. home w/ home PT depending on progression with PT.

## 2021-04-01 NOTE — PROGRESS NOTE ADULT - PROBLEM SELECTOR PROBLEM 1
Bilateral primary osteoarthritis of knee

## 2021-04-01 NOTE — PROGRESS NOTE ADULT - SUBJECTIVE AND OBJECTIVE BOX
Patient is a 51y old  Female who presents with a chief complaint of elective Right total knee arthroplasty 3/25/2021 (29 Mar 2021 10:27)        SUBJECTIVE / OVERNIGHT EVENTS:  no acute events o/n  pt still really wants to go to [facility], states she has a lot of stairs at home, and hasn't done stairs with PT here yet  denies cp/sob/abd pain       MEDICATIONS  (STANDING):  acetaminophen   Tablet .. 975 milliGRAM(s) Oral every 8 hours  ascorbic acid 500 milliGRAM(s) Oral two times a day  aspirin enteric coated 325 milliGRAM(s) Oral every 12 hours  calcium carbonate 1250 mG  + Vitamin D (OsCal 500 + D) 1 Tablet(s) Oral three times a day  ferrous    sulfate 325 milliGRAM(s) Oral daily  gabapentin 100 milliGRAM(s) Oral three times a day  influenza   Vaccine 0.5 milliLiter(s) IntraMuscular once  multivitamin 1 Tablet(s) Oral daily  pantoprazole    Tablet 40 milliGRAM(s) Oral before breakfast  polyethylene glycol 3350 17 Gram(s) Oral at bedtime  senna 2 Tablet(s) Oral at bedtime  sodium chloride 0.9% lock flush 3 milliLiter(s) IV Push every 8 hours    MEDICATIONS  (PRN):  calcium carbonate    500 mG (Tums) Chewable 1 Tablet(s) Chew every 6 hours PRN Heartburn  HYDROmorphone  Injectable 0.5 milliGRAM(s) IV Push once PRN breakthrough  magnesium hydroxide Suspension 30 milliLiter(s) Oral daily PRN Constipation  ondansetron Injectable 4 milliGRAM(s) IV Push every 6 hours PRN Nausea and/or Vomiting  oxyCODONE    IR 5 milliGRAM(s) Oral every 4 hours PRN Moderate Pain (4 - 6)  oxyCODONE    IR 10 milliGRAM(s) Oral every 4 hours PRN Severe Pain (7 - 10)      Vital Signs Last 24 Hrs  T(C): 36.9 (30 Mar 2021 09:03), Max: 37.2 (29 Mar 2021 21:51)  T(F): 98.5 (30 Mar 2021 09:03), Max: 98.9 (29 Mar 2021 21:51)  HR: 91 (30 Mar 2021 09:03) (89 - 93)  BP: 131/70 (30 Mar 2021 09:03) (123/57 - 140/80)  BP(mean): --  RR: 17 (30 Mar 2021 09:03) (17 - 17)  SpO2: 99% (30 Mar 2021 09:03) (97% - 100%)  CAPILLARY BLOOD GLUCOSE        I&O's Summary    29 Mar 2021 07:01  -  30 Mar 2021 07:00  --------------------------------------------------------  IN: 0 mL / OUT: 1700 mL / NET: -1700 mL          PHYSICAL EXAM    CONSTITUTIONAL: NAD, well-developed  RESPIRATORY: Clear to auscultation bilaterally; No rales, rhonchi, wheezing, or rubs  CARDIOVASCULAR: Regular rate and rhythm; No murmurs, rubs, or gallops  GASTROINTESTINAL: Soft, Nontender, Nondistended; Bowel sounds present  EXTREMITIES:  R knee with dressing c/d/i  NERVOUS SYSTEM:  Alert & Oriented X3; No gross sensory deficits      LABS:                    RADIOLOGY & ADDITIONAL TESTS:    Imaging Personally Reviewed:  Consultant(s) Notes Reviewed:    Care Discussed with Consultants/Other Providers:  
Patient is seen and examined. Denies CP/SOB/DIzziness/N/V/D/HA. Pain is controlled off of the PCA. Has been OOB.    ICU Vital Signs Last 24 Hrs  T(C): 36.6 (27 Mar 2021 01:18), Max: 37 (26 Mar 2021 14:24)  T(F): 97.9 (27 Mar 2021 01:18), Max: 98.6 (26 Mar 2021 14:24)  HR: 78 (27 Mar 2021 01:18) (78 - 100)  BP: 123/54 (27 Mar 2021 01:18) (100/76 - 126/74)  BP(mean): --  ABP: --  ABP(mean): --  RR: 18 (27 Mar 2021 01:18) (16 - 18)  SpO2: 96% (27 Mar 2021 01:18) (96% - 99%)    O:   PE:  Gen: NAD, laying comfortably in bed  Resp: Unlabored breathing  MSK: Dressing c/d/i          +EHL/FHL/TA/Gas/SOl          +DP/SP/Kasie/Saph           2+DP            A/P: Patient is a 51yFemale s/p R total knee arthroplasty, progressing well.  - Pain control / Analgesia  -Antibiotic  - Anticoagulation  -PT/OT  -WBAT  -OOB  -Inc Spirometry  -Foot Pumps  -F/U AM Labs  -Notify Ortho with any questions
Patient is seen and examined. Denies CP/SOB/DIzziness/N/V/D/HA. Pain is controlled.     Vital Signs Last 24 Hrs  T(C): 36 (25 Mar 2021 12:00), Max: 36.3 (25 Mar 2021 06:34)  T(F): 96.8 (25 Mar 2021 12:00), Max: 97.4 (25 Mar 2021 06:34)  HR: 77 (25 Mar 2021 14:00) (60 - 77)  BP: 110/69 (25 Mar 2021 14:00) (90/53 - 126/74)  BP(mean): 77 (25 Mar 2021 14:00) (62 - 83)  RR: 16 (25 Mar 2021 14:00) (12 - 23)  SpO2: 100% (25 Mar 2021 14:00) (96% - 100%)    Gen: NAD    BLE: Dressing C/D/I.  Motor intact BL LE. Sensation is grossly intact in the BL LE. Compartments are soft, extremities are warm. DP 1+ BL LE.    Labs:                        13.1   4.91  )-----------( 267      ( 25 Mar 2021 11:01 )             40.1       03-25    136  |  101  |  17  ----------------------------<  88  3.9   |  28  |  0.53    Ca    9.2      25 Mar 2021 11:01        A/P: Patient is a 51yFemale s/p R total knee arthroplasty, POD # 0    - Pain control / Analgesia  -Antibiotic  - Anticoagulation  -PT/OT  -WBAT  -OOB  -Inc Spirometry  -Foot Pumps  -F/U AM Labs  -Notify Ortho with any questions
ORTHO PROGRESS NOTE     Pt seen and examined at bedside, denies SOB, CP, Dizziness. N/V/D /HA.  No significant overnight events. Pain well controlled. Patient ambulating with PT     Vital Signs Last 24 Hrs  T(C): 37 (31 Mar 2021 05:10), Max: 37.1 (30 Mar 2021 21:31)  T(F): 98.6 (31 Mar 2021 05:10), Max: 98.7 (30 Mar 2021 21:31)  HR: 74 (31 Mar 2021 05:10) (74 - 94)  BP: 120/68 (31 Mar 2021 05:10) (107/84 - 133/73)  BP(mean): --  RR: 14 (31 Mar 2021 05:10) (14 - 17)  SpO2: 96% (31 Mar 2021 05:10) (95% - 99%)    Gen: No apparent distress    LE:  Dressing C/D I  HV in place / HV d/c'd   BLE: motor intact EHL/FHL/TA/GS .  Sensation is grossly intact to light touch in the distal extremities.  Compartments are soft bilaterally.  Extremities are warm .  DP 2+ BLE        A/P  s/p Knee Arthroplasty, POD #6    - Pain control/ Analgesia  - DVT ppx ASA 325bid foot pumps  - PT/OT - wbat/oob    - Incentive Spirometer  - Rehab planning   - notify Ortho for questions 
Patient is seen and examined. Denies CP/SOB/DIzziness/N/V/D/HA. Pain is controlled. Has been OOB.    ICU Vital Signs Last 24 Hrs  T(C): 37.1 (26 Mar 2021 01:33), Max: 37.1 (25 Mar 2021 22:00)  T(F): 98.7 (26 Mar 2021 01:33), Max: 98.8 (25 Mar 2021 22:00)  HR: 96 (26 Mar 2021 01:33) (60 - 96)  BP: 150/87 (26 Mar 2021 01:33) (90/53 - 150/87)  BP(mean): 97 (25 Mar 2021 17:00) (62 - 97)  ABP: --  ABP(mean): --  RR: 16 (26 Mar 2021 01:33) (12 - 23)  SpO2: 98% (26 Mar 2021 01:33) (96% - 100%)    O:   PE:  Gen: NAD, laying comfortably in bed  Resp: Unlabored breathing  MSK: Dressing c/d/i          +EHL/FHL/TA/Gas/SOl          +DP/SP/Kasie/Saph           2+DP               Labs:                        13.1   4.91  )-----------( 267      ( 25 Mar 2021 11:01 )             40.1       03-25    136  |  101  |  17  ----------------------------<  88  3.9   |  28  |  0.53    Ca    9.2      25 Mar 2021 11:01        A/P: Patient is a 51yFemale s/p R total knee arthroplasty, progressing well.  - Pain control / Analgesia  -Antibiotic  - Anticoagulation  -PT/OT  -WBAT  -OOB  -Inc Spirometry  -Foot Pumps  -F/U AM Labs  -Notify Ortho with any questions
Patient seen and examined. Pain controlled. No acute events overnight.      MEDICATIONS  (STANDING):  acetaminophen   Tablet .. 975 milliGRAM(s) Oral every 8 hours  ascorbic acid 500 milliGRAM(s) Oral two times a day  aspirin enteric coated 325 milliGRAM(s) Oral every 12 hours  calcium carbonate 1250 mG  + Vitamin D (OsCal 500 + D) 1 Tablet(s) Oral three times a day  ferrous    sulfate 325 milliGRAM(s) Oral daily  folic acid 1 milliGRAM(s) Oral daily  gabapentin 100 milliGRAM(s) Oral three times a day  influenza   Vaccine 0.5 milliLiter(s) IntraMuscular once  lactated ringers. 1000 milliLiter(s) IV Continuous <Continuous>  multivitamin 1 Tablet(s) Oral daily  pantoprazole    Tablet 40 milliGRAM(s) Oral before breakfast  polyethylene glycol 3350 17 Gram(s) Oral at bedtime  senna 2 Tablet(s) Oral at bedtime  sodium chloride 0.9% lock flush 3 milliLiter(s) IV Push every 8 hours  tiZANidine 1 milliGRAM(s) Oral every 6 hours    Allergies    penicillin (Anaphylaxis)    Intolerances                            10.8   7.56  )-----------( 250      ( 28 Mar 2021 09:31 )             33.5     28 Mar 2021 09:31    138    |  101    |  23     ----------------------------<  80     3.8     |  26     |  0.55     Ca    9.4        28 Mar 2021 09:31        Vital Signs Last 24 Hrs  T(C): 37.1 (03-29-21 @ 05:37), Max: 37.1 (03-28-21 @ 16:09)  T(F): 98.7 (03-29-21 @ 05:37), Max: 98.7 (03-28-21 @ 16:09)  HR: 82 (03-29-21 @ 05:37) (72 - 90)  BP: 114/62 (03-29-21 @ 05:37) (103/41 - 135/56)  BP(mean): --  RR: 16 (03-29-21 @ 05:37) (16 - 18)  SpO2: 97% (03-29-21 @ 05:37) (96% - 100%)    Physical Exam  Gen: NAD  RLE:   Dressing c/d/i  +ehl/fhl/ta/gs function  L2-S1 silt  Dp/pt pulse intact  No calf ttp  Compartments soft    A/P: 51y Female sp R TKA  Pain control  DVT ppx asa  PT/WBAT/OOB  FU labs  Ice/elevate  Medical management appreciated  Incentive spirometry  Dispo planning  
Patient seen and examined. Pain controlled. No acute events overnight. Patient walked with PT.      MEDICATIONS  (STANDING):  acetaminophen   Tablet .. 975 milliGRAM(s) Oral every 8 hours  ascorbic acid 500 milliGRAM(s) Oral two times a day  aspirin enteric coated 325 milliGRAM(s) Oral every 12 hours  calcium carbonate 1250 mG  + Vitamin D (OsCal 500 + D) 1 Tablet(s) Oral three times a day  ferrous    sulfate 325 milliGRAM(s) Oral daily  gabapentin 100 milliGRAM(s) Oral three times a day  influenza   Vaccine 0.5 milliLiter(s) IntraMuscular once  multivitamin 1 Tablet(s) Oral daily  pantoprazole    Tablet 40 milliGRAM(s) Oral before breakfast  polyethylene glycol 3350 17 Gram(s) Oral at bedtime  senna 2 Tablet(s) Oral at bedtime  sodium chloride 0.9% lock flush 3 milliLiter(s) IV Push every 8 hours    Allergies    penicillin (Anaphylaxis)    Intolerances                            10.8   7.56  )-----------( 250      ( 28 Mar 2021 09:31 )             33.5     28 Mar 2021 09:31    138    |  101    |  23     ----------------------------<  80     3.8     |  26     |  0.55     Ca    9.4        28 Mar 2021 09:31        Vital Signs Last 24 Hrs  T(C): 36.7 (03-30-21 @ 05:08), Max: 37.2 (03-29-21 @ 21:51)  T(F): 98.1 (03-30-21 @ 05:08), Max: 98.9 (03-29-21 @ 21:51)  HR: 89 (03-30-21 @ 05:08) (84 - 93)  BP: 140/80 (03-30-21 @ 05:08) (108/83 - 140/80)  BP(mean): --  RR: 17 (03-30-21 @ 05:08) (16 - 17)  SpO2: 97% (03-30-21 @ 05:08) (97% - 100%)    Physical Exam  Gen: NAD  RLE:   Dressing c/d/i  +ehl/fhl/ta/gs function  L2-S1 silt  pt pulse intact, Dp dopplerable  No calf ttp  Compartments soft    A/P: 51y Female sp R TKA  Pain control  DVT ppx, asa  PT/WBAT/OOB, in liliana unlocked  FU labs  Ice/elevate  Medical management appreciated  Incentive spirometry  Dispo planning  
Patient is a 51y old  Female who presents with a chief complaint of elective Right total knee arthroplasty 3/25/2021 (29 Mar 2021 10:27)        SUBJECTIVE / OVERNIGHT EVENTS:  no acute events o/n  denies cp/sob/abd pain  working with PT  looking fwd to going to [facility]       MEDICATIONS  (STANDING):  acetaminophen   Tablet .. 975 milliGRAM(s) Oral every 8 hours  ascorbic acid 500 milliGRAM(s) Oral two times a day  aspirin enteric coated 325 milliGRAM(s) Oral every 12 hours  calcium carbonate 1250 mG  + Vitamin D (OsCal 500 + D) 1 Tablet(s) Oral three times a day  ferrous    sulfate 325 milliGRAM(s) Oral daily  gabapentin 100 milliGRAM(s) Oral three times a day  influenza   Vaccine 0.5 milliLiter(s) IntraMuscular once  multivitamin 1 Tablet(s) Oral daily  pantoprazole    Tablet 40 milliGRAM(s) Oral before breakfast  polyethylene glycol 3350 17 Gram(s) Oral at bedtime  senna 2 Tablet(s) Oral at bedtime  sodium chloride 0.9% lock flush 3 milliLiter(s) IV Push every 8 hours    MEDICATIONS  (PRN):  calcium carbonate    500 mG (Tums) Chewable 1 Tablet(s) Chew every 6 hours PRN Heartburn  HYDROmorphone  Injectable 0.5 milliGRAM(s) IV Push once PRN breakthrough  magnesium hydroxide Suspension 30 milliLiter(s) Oral daily PRN Constipation  ondansetron Injectable 4 milliGRAM(s) IV Push every 6 hours PRN Nausea and/or Vomiting  oxyCODONE    IR 10 milliGRAM(s) Oral every 4 hours PRN Severe Pain (7 - 10)  oxyCODONE    IR 5 milliGRAM(s) Oral every 4 hours PRN Moderate Pain (4 - 6)      Vital Signs Last 24 Hrs  T(C): 36.9 (31 Mar 2021 09:53), Max: 37.1 (30 Mar 2021 21:31)  T(F): 98.5 (31 Mar 2021 09:53), Max: 98.7 (30 Mar 2021 21:31)  HR: 82 (31 Mar 2021 09:53) (74 - 94)  BP: 134/79 (31 Mar 2021 09:53) (107/84 - 134/79)  BP(mean): --  RR: 11 (31 Mar 2021 09:53) (11 - 17)  SpO2: 96% (31 Mar 2021 09:53) (95% - 99%)  CAPILLARY BLOOD GLUCOSE        I&O's Summary        PHYSICAL EXAM  GENERAL: NAD, well-developed  NECK: Supple, No JVD  CHEST/LUNG: Clear to auscultation bilaterally; No wheeze  HEART: Regular rate and rhythm; No murmurs, rubs, or gallops  ABDOMEN: Soft, Nontender, Nondistended; Bowel sounds present  EXTREMITIES:  2+ Peripheral Pulses, No clubbing, cyanosis, or edema      LABS:                    RADIOLOGY & ADDITIONAL TESTS:    Imaging Personally Reviewed:  Consultant(s) Notes Reviewed:    Care Discussed with Consultants/Other Providers:  
Patient is seen and examined. Denies CP/SOB/DIzziness/N/V/D/HA.   Patient ambulated with PT today.  Pain medication adjusted yesterday and patient states pain is better controlled now.     Vitals 24hrs  Vital Signs Last 24 Hrs  T(C): 36.8 (28 Mar 2021 02:08), Max: 37 (27 Mar 2021 22:03)  T(F): 98.2 (28 Mar 2021 02:08), Max: 98.6 (27 Mar 2021 22:03)  HR: 72 (28 Mar 2021 02:08) (72 - 108)  BP: 111/60 (28 Mar 2021 02:08) (111/60 - 141/76)  BP(mean): --  RR: 17 (28 Mar 2021 02:08) (17 - 18)  SpO2: 97% (28 Mar 2021 02:08) (96% - 98%)      03-26-21 @ 07:01  -  03-27-21 @ 07:00  --------------------------------------------------------  IN: 0 mL / OUT: 800 mL / NET: -800 mL    03-27-21 @ 07:01  -  03-28-21 @ 03:37  --------------------------------------------------------  IN: 0 mL / OUT: 1250 mL / NET: -1250 mL        Lab Results 24hrs:                        13.1   8.96  )-----------( 284      ( 26 Mar 2021 06:24 )             39.6     03-26    137  |  102  |  13  ----------------------------<  172<H>  3.9   |  23  |  0.43<L>    Ca    9.7      26 Mar 2021 06:24        O:   PE:  Gen: NAD, laying comfortably in bed  Resp: Unlabored breathing  MSK: Dressing c/d/i          +EHL/FHL/TA/Gas/SOl          +DP/SP/Kasie/Saph           2+DP        A/P: Patient is a 51yFemale s/p R total knee arthroplasty, progressing well.  - Pain control / Analgesia  - Appreciate pain management recs  -PT/OT  -WBAT in unlocked [place]  -OOB  -Inc Spirometry  -F/U AM Labs  -Notify Ortho with any questions  - Dispo: Home
Ortho Progress Note  GINNA PICKARD   MRN-[id number]    51yFemale is s/p elective Right total knee arthroplasty POD#7 w/out any c/o.  Resting comfortably, NAD, ANO x 3    Vital Signs Last 24 Hrs  T(C): 36.9 (01 Apr 2021 05:11), Max: 37.2 (31 Mar 2021 21:30)  T(F): 98.5 (01 Apr 2021 05:11), Max: 99 (31 Mar 2021 21:30)  HR: 80 (01 Apr 2021 05:11) (80 - 90)  BP: 126/70 (01 Apr 2021 05:11) (104/67 - 134/79)  BP(mean): --  RR: 16 (01 Apr 2021 05:11) (11 - 16)  SpO2: 98% (01 Apr 2021 05:11) (96% - 99%)  penicillin (Anaphylaxis)      S/P elective Right total knee arthroplasty  R knee mepilex wound dressing is C/D/I  motor BLE EHL/TA/GS 5/5 intact  sensation BLE intact to light touch  [place] off currently - at bedside          A/P Ortho Stable s/p elective Right total knee arthroplasty POD#7  continue Aspirin for DVT ppx  continue Physical Therapy BID: WBAT, OOB for gait training w/ Orient brace on unlocked per Dr. Landin  discharge planning to REHAB once ins auth comes through
Dr. Kristina Ames  Pager [phone]    PROGRESS NOTE:     Patient is a 51y old  Female who presents with a chief complaint of     SUBJECTIVE / OVERNIGHT EVENTS: pt denies chest pain or sob  ADDITIONAL REVIEW OF SYSTEMS: reports pains in her right knee is not well controlled, states she needs high dose of oxycodone    MEDICATIONS  (STANDING):  acetaminophen   Tablet .. 975 milliGRAM(s) Oral every 8 hours  ascorbic acid 500 milliGRAM(s) Oral two times a day  aspirin enteric coated 325 milliGRAM(s) Oral every 12 hours  calcium carbonate 1250 mG  + Vitamin D (OsCal 500 + D) 1 Tablet(s) Oral three times a day  ferrous    sulfate 325 milliGRAM(s) Oral daily  folic acid 1 milliGRAM(s) Oral daily  gabapentin 100 milliGRAM(s) Oral three times a day  influenza   Vaccine 0.5 milliLiter(s) IntraMuscular once  ketorolac   Injectable 30 milliGRAM(s) IV Push every 6 hours  lactated ringers. 1000 milliLiter(s) (75 mL/Hr) IV Continuous <Continuous>  multivitamin 1 Tablet(s) Oral daily  pantoprazole    Tablet 40 milliGRAM(s) Oral before breakfast  polyethylene glycol 3350 17 Gram(s) Oral at bedtime  senna 2 Tablet(s) Oral at bedtime  sodium chloride 0.9% lock flush 3 milliLiter(s) IV Push every 8 hours  tiZANidine 1 milliGRAM(s) Oral every 6 hours    MEDICATIONS  (PRN):  calcium carbonate    500 mG (Tums) Chewable 1 Tablet(s) Chew every 6 hours PRN Heartburn  HYDROmorphone  Injectable 0.5 milliGRAM(s) IV Push once PRN breakthrough  magnesium hydroxide Suspension 30 milliLiter(s) Oral daily PRN Constipation  ondansetron Injectable 4 milliGRAM(s) IV Push every 6 hours PRN Nausea and/or Vomiting  oxyCODONE    IR 5 milliGRAM(s) Oral every 4 hours PRN Moderate Pain (4 - 6)  oxyCODONE    IR 10 milliGRAM(s) Oral every 4 hours PRN Severe Pain (7 - 10)      CAPILLARY BLOOD GLUCOSE        I&O's Summary    26 Mar 2021 07:01  -  27 Mar 2021 07:00  --------------------------------------------------------  IN: 0 mL / OUT: 800 mL / NET: -800 mL        PHYSICAL EXAM:  Vital Signs Last 24 Hrs  T(C): 36.8 (27 Mar 2021 10:06), Max: 37 (26 Mar 2021 14:24)  T(F): 98.2 (27 Mar 2021 10:06), Max: 98.6 (26 Mar 2021 14:24)  HR: 95 (27 Mar 2021 10:06) (78 - 100)  BP: 133/88 (27 Mar 2021 10:06) (113/75 - 133/88)  BP(mean): --  RR: 17 (27 Mar 2021 10:06) (16 - 18)  SpO2: 97% (27 Mar 2021 10:06) (96% - 98%)  CONSTITUTIONAL: NAD, well-developed  HEAD:  Atraumatic, Normocephalic  ENMT: Moist mucous membranes  RESPIRATORY: Clear to auscultation bilaterally; No rales, rhonchi, wheezing, or rubs  CARDIOVASCULAR: Regular rate and rhythm; No murmurs, rubs, or gallops  GASTROINTESTINAL: Soft, Nontender, Nondistended; Bowel sounds present  GENITOURINARY: Not examined  EXTREMITIES:  R knee with dressing c/d/i, dorsi and plantar flexion intact. sensation intact   NERVOUS SYSTEM:  Alert & Oriented X3; No gross sensory deficits    LABS:                        13.1   8.96  )-----------( 284      ( 26 Mar 2021 06:24 )             39.6     03-26    137  |  102  |  13  ----------------------------<  172<H>  3.9   |  23  |  0.43<L>    Ca    9.7      26 Mar 2021 06:24      RADIOLOGY & ADDITIONAL TESTS:  Results Reviewed:   Imaging Personally Reviewed:  Electrocardiogram Personally Reviewed:    COORDINATION OF CARE:  Care Discussed with Consultants/Other Providers [Y/N]:  Prior or Outpatient Records Reviewed [Y/N]:  
Dr. Kristina Ames  Pager [phone]    PROGRESS NOTE:     Patient is a 51y old  Female who presents with a chief complaint of s/p surgery (27 Mar 2021 13:01)      SUBJECTIVE / OVERNIGHT EVENTS: pt denies chest pain or sob   ADDITIONAL REVIEW OF SYSTEMS: afebrile , pain better controlled today    MEDICATIONS  (STANDING):  acetaminophen   Tablet .. 975 milliGRAM(s) Oral every 8 hours  ascorbic acid 500 milliGRAM(s) Oral two times a day  aspirin enteric coated 325 milliGRAM(s) Oral every 12 hours  calcium carbonate 1250 mG  + Vitamin D (OsCal 500 + D) 1 Tablet(s) Oral three times a day  ferrous    sulfate 325 milliGRAM(s) Oral daily  folic acid 1 milliGRAM(s) Oral daily  gabapentin 100 milliGRAM(s) Oral three times a day  influenza   Vaccine 0.5 milliLiter(s) IntraMuscular once  lactated ringers. 1000 milliLiter(s) (75 mL/Hr) IV Continuous <Continuous>  multivitamin 1 Tablet(s) Oral daily  pantoprazole    Tablet 40 milliGRAM(s) Oral before breakfast  polyethylene glycol 3350 17 Gram(s) Oral at bedtime  senna 2 Tablet(s) Oral at bedtime  sodium chloride 0.9% lock flush 3 milliLiter(s) IV Push every 8 hours  tiZANidine 1 milliGRAM(s) Oral every 6 hours    MEDICATIONS  (PRN):  calcium carbonate    500 mG (Tums) Chewable 1 Tablet(s) Chew every 6 hours PRN Heartburn  HYDROmorphone  Injectable 0.5 milliGRAM(s) IV Push once PRN breakthrough  magnesium hydroxide Suspension 30 milliLiter(s) Oral daily PRN Constipation  ondansetron Injectable 4 milliGRAM(s) IV Push every 6 hours PRN Nausea and/or Vomiting  oxyCODONE    IR 5 milliGRAM(s) Oral every 4 hours PRN Moderate Pain (4 - 6)  oxyCODONE    IR 10 milliGRAM(s) Oral every 4 hours PRN Severe Pain (7 - 10)      CAPILLARY BLOOD GLUCOSE        I&O's Summary    27 Mar 2021 07:01  -  28 Mar 2021 07:00  --------------------------------------------------------  IN: 0 mL / OUT: 1250 mL / NET: -1250 mL    28 Mar 2021 07:01  -  28 Mar 2021 12:21  --------------------------------------------------------  IN: 0 mL / OUT: 700 mL / NET: -700 mL        PHYSICAL EXAM:  Vital Signs Last 24 Hrs  T(C): 36.8 (28 Mar 2021 09:03), Max: 37 (27 Mar 2021 22:03)  T(F): 98.2 (28 Mar 2021 09:03), Max: 98.6 (27 Mar 2021 22:03)  HR: 88 (28 Mar 2021 09:03) (72 - 108)  BP: 135/56 (28 Mar 2021 09:03) (111/60 - 141/76)  BP(mean): --  RR: 18 (28 Mar 2021 09:03) (17 - 18)  SpO2: 100% (28 Mar 2021 09:03) (96% - 100%)  CONSTITUTIONAL: NAD, well-developed  HEAD:  Atraumatic, Normocephalic  ENMT: Moist mucous membranes  RESPIRATORY: Clear to auscultation bilaterally; No rales, rhonchi, wheezing, or rubs  CARDIOVASCULAR: Regular rate and rhythm; No murmurs, rubs, or gallops  GASTROINTESTINAL: Soft, Nontender, Nondistended; Bowel sounds present  GENITOURINARY: Not examined  EXTREMITIES:  R knee with dressing c/d/i, dorsi and plantar flexion intact. sensation intact   NERVOUS SYSTEM:  Alert & Oriented X3; No gross sensory deficits      LABS:                        10.8   7.56  )-----------( 250      ( 28 Mar 2021 09:31 )             33.5     03-28    138  |  101  |  23  ----------------------------<  80  3.8   |  26  |  0.55    Ca    9.4      28 Mar 2021 09:31          RADIOLOGY & ADDITIONAL TESTS:  Results Reviewed:   Imaging Personally Reviewed:  Electrocardiogram Personally Reviewed:    COORDINATION OF CARE:  Care Discussed with Consultants/Other Providers [Y/N]:  Prior or Outpatient Records Reviewed [Y/N]:  
Patient is a 51y old  Female who presents with a chief complaint of elective Right total knee arthroplasty 3/25/2021 (29 Mar 2021 10:27)        SUBJECTIVE / OVERNIGHT EVENTS:  no acute events o/n  pt sitting in bed  pt states she will def need to go to rehab, does not think she can manage at home  denies cp/sob  pain controlled    MEDICATIONS  (STANDING):  acetaminophen   Tablet .. 975 milliGRAM(s) Oral every 8 hours  ascorbic acid 500 milliGRAM(s) Oral two times a day  aspirin enteric coated 325 milliGRAM(s) Oral every 12 hours  calcium carbonate 1250 mG  + Vitamin D (OsCal 500 + D) 1 Tablet(s) Oral three times a day  ferrous    sulfate 325 milliGRAM(s) Oral daily  gabapentin 100 milliGRAM(s) Oral three times a day  influenza   Vaccine 0.5 milliLiter(s) IntraMuscular once  multivitamin 1 Tablet(s) Oral daily  pantoprazole    Tablet 40 milliGRAM(s) Oral before breakfast  polyethylene glycol 3350 17 Gram(s) Oral at bedtime  senna 2 Tablet(s) Oral at bedtime  sodium chloride 0.9% lock flush 3 milliLiter(s) IV Push every 8 hours  tiZANidine 1 milliGRAM(s) Oral every 6 hours    MEDICATIONS  (PRN):  calcium carbonate    500 mG (Tums) Chewable 1 Tablet(s) Chew every 6 hours PRN Heartburn  HYDROmorphone  Injectable 0.5 milliGRAM(s) IV Push once PRN breakthrough  magnesium hydroxide Suspension 30 milliLiter(s) Oral daily PRN Constipation  ondansetron Injectable 4 milliGRAM(s) IV Push every 6 hours PRN Nausea and/or Vomiting  oxyCODONE    IR 5 milliGRAM(s) Oral every 4 hours PRN Moderate Pain (4 - 6)  oxyCODONE    IR 10 milliGRAM(s) Oral every 4 hours PRN Severe Pain (7 - 10)      Vital Signs Last 24 Hrs  T(C): 36.8 (29 Mar 2021 09:42), Max: 37.1 (28 Mar 2021 16:09)  T(F): 98.2 (29 Mar 2021 09:42), Max: 98.7 (28 Mar 2021 16:09)  HR: 84 (29 Mar 2021 09:42) (72 - 90)  BP: 108/83 (29 Mar 2021 09:42) (103/41 - 114/62)  BP(mean): --  RR: 16 (29 Mar 2021 09:42) (16 - 18)  SpO2: 98% (29 Mar 2021 09:42) (96% - 100%)  CAPILLARY BLOOD GLUCOSE        I&O's Summary    28 Mar 2021 07:01  -  29 Mar 2021 07:00  --------------------------------------------------------  IN: 0 mL / OUT: 1300 mL / NET: -1300 mL    29 Mar 2021 07:01  -  29 Mar 2021 12:09  --------------------------------------------------------  IN: 0 mL / OUT: 400 mL / NET: -400 mL          PHYSICAL EXAM    CONSTITUTIONAL: NAD, well-developed  RESPIRATORY: Clear to auscultation bilaterally; No rales, rhonchi, wheezing, or rubs  CARDIOVASCULAR: Regular rate and rhythm; No murmurs, rubs, or gallops  GASTROINTESTINAL: Soft, Nontender, Nondistended; Bowel sounds present  EXTREMITIES:  R knee with dressing c/d/i  NERVOUS SYSTEM:  Alert & Oriented X3; No gross sensory deficits      LABS:                        10.8   7.56  )-----------( 250      ( 28 Mar 2021 09:31 )             33.5     03-28    138  |  101  |  23  ----------------------------<  80  3.8   |  26  |  0.55    Ca    9.4      28 Mar 2021 09:31                RADIOLOGY & ADDITIONAL TESTS:    Imaging Personally Reviewed:  Consultant(s) Notes Reviewed:    Care Discussed with Consultants/Other Providers:  
Patient is a 51y old  Female who presents with a chief complaint of elective Right total knee arthroplasty 3/25/2021 (01 Apr 2021 06:47)        SUBJECTIVE / OVERNIGHT EVENTS:  no acute events o/n  pt comfortable  dressed for dc to rehab    MEDICATIONS  (STANDING):  acetaminophen   Tablet .. 975 milliGRAM(s) Oral every 8 hours  ascorbic acid 500 milliGRAM(s) Oral two times a day  aspirin enteric coated 325 milliGRAM(s) Oral every 12 hours  calcium carbonate 1250 mG  + Vitamin D (OsCal 500 + D) 1 Tablet(s) Oral three times a day  ferrous    sulfate 325 milliGRAM(s) Oral daily  gabapentin 100 milliGRAM(s) Oral three times a day  multivitamin 1 Tablet(s) Oral daily  pantoprazole    Tablet 40 milliGRAM(s) Oral before breakfast  polyethylene glycol 3350 17 Gram(s) Oral at bedtime  senna 2 Tablet(s) Oral at bedtime  sodium chloride 0.9% lock flush 3 milliLiter(s) IV Push every 8 hours    MEDICATIONS  (PRN):  calcium carbonate    500 mG (Tums) Chewable 1 Tablet(s) Chew every 6 hours PRN Heartburn  HYDROmorphone  Injectable 0.5 milliGRAM(s) IV Push once PRN breakthrough  magnesium hydroxide Suspension 30 milliLiter(s) Oral daily PRN Constipation  ondansetron Injectable 4 milliGRAM(s) IV Push every 6 hours PRN Nausea and/or Vomiting  oxyCODONE    IR 5 milliGRAM(s) Oral every 4 hours PRN Moderate Pain (4 - 6)  oxyCODONE    IR 10 milliGRAM(s) Oral every 4 hours PRN Severe Pain (7 - 10)      Vital Signs Last 24 Hrs  T(C): 36.9 (01 Apr 2021 08:51), Max: 37.2 (31 Mar 2021 21:30)  T(F): 98.5 (01 Apr 2021 08:51), Max: 99 (31 Mar 2021 21:30)  HR: 81 (01 Apr 2021 08:51) (80 - 90)  BP: 117/64 (01 Apr 2021 08:51) (104/67 - 126/70)  BP(mean): --  RR: 16 (01 Apr 2021 08:51) (15 - 16)  SpO2: 98% (01 Apr 2021 08:51) (97% - 99%)  CAPILLARY BLOOD GLUCOSE        I&O's Summary    31 Mar 2021 07:01  -  01 Apr 2021 07:00  --------------------------------------------------------  IN: 0 mL / OUT: 0 mL / NET: 0 mL          PHYSICAL EXAM  GENERAL: NAD, well-developed  HEAD:  Atraumatic, Normocephalic  EYES: EOMI, PERRLA, conjunctiva and sclera clear  NECK: Supple, No JVD  CHEST/LUNG: Clear to auscultation bilaterally; No wheeze  HEART: Regular rate and rhythm; No murmurs, rubs, or gallops  ABDOMEN: Soft, Nontender, Nondistended; Bowel sounds present  EXTREMITIES:  2+ Peripheral Pulses, No clubbing, cyanosis, or edema      LABS:                    RADIOLOGY & ADDITIONAL TESTS:    Imaging Personally Reviewed:  Consultant(s) Notes Reviewed:    Care Discussed with Consultants/Other Providers:

## 2021-04-01 NOTE — PROGRESS NOTE ADULT - PROVIDER SPECIALTY LIST ADULT
Orthopedics
Hospitalist
Hospitalist
Orthopedics
Orthopedics
Hospitalist

## 2021-04-01 NOTE — PROGRESS NOTE ADULT - PROBLEM SELECTOR PLAN 1
POD #4 s/p R TKA   - care per primary orthopedic team   -multimodal pain control: acetaminophen, oxycodone, gabapentin, zanaflex,   -pain better controlled on above regimen per pain management   - bowel regimen   - PT/OT/OOB   - WBAT   - Encourage incentive spirometry  -
POD #2 s/p R TKA   - care per primary orthopedic team   - pain control: acetaminophen, gabapentin, dilaudid prn, toradol prn  - pt has h/o narcotic dependence and states her pain is not well controlled  - pain management consult per ortho   - bowel regimen   - PT/OT/OOB   - WBAT   - Encourage incentive spirometry.
POD #3 s/p R TKA   - care per primary orthopedic team   -multimodal pain control: acetaminophen, oxycodone, gabapentin, zanaflex, dilaudid prn  -pain better controlled on above regimen per pain management   - bowel regimen   - PT/OT/OOB   - WBAT   - Encourage incentive spirometry.  - dispo planning home with home PT per primary team
POD #7 s/p R TKA    - care per primary orthopedic team   -multimodal pain control: acetaminophen, oxycodone, gabapentin, zanaflex,   -pain better controlled on above regimen per pain management   - bowel regimen   - PT/OT/OOB   - WBAT   - Encourage incentive spirometry
POD #5 s/p R TKA    - care per primary orthopedic team   -multimodal pain control: acetaminophen, oxycodone, gabapentin, zanaflex,   -pain better controlled on above regimen per pain management   - bowel regimen   - PT/OT/OOB   - WBAT   - Encourage incentive spirometry
POD #6 s/p R TKA    - care per primary orthopedic team   -multimodal pain control: acetaminophen, oxycodone, gabapentin, zanaflex,   -pain better controlled on above regimen per pain management   - bowel regimen   - PT/OT/OOB   - WBAT   - Encourage incentive spirometry

## 2021-04-01 NOTE — PROGRESS NOTE ADULT - ASSESSMENT
Attending:    Pt called. Continues to have pain, difficulty with weight bearing. She was previously dependent on high dose narcotics for other issues (back, hip) and admits that she intentionally withheld this information from me preop to avoid any "stigma". I've explained that she certainly is at high risk for developing chronic pain and it will be difficult to treat her acute pain for these reasons. We will reach out the pain management team for better pain control. 
Attending:    Pt called. Continues to have pain, difficulty with weight bearing. She was previously dependent on high dose narcotics for other issues (back, hip) and admits that she intentionally withheld this information from me preop to avoid any "stigma". I've explained that she certainly is at high risk for developing chronic pain and it will be difficult to treat her acute pain for these reasons. We will reach out the pain management team for better pain control. 
51 year old female with no significant PMH s/p right total knee arthroplasty.

## 2021-04-05 ENCOUNTER — APPOINTMENT (OUTPATIENT)
Dept: ORTHOPEDIC SURGERY | Facility: CLINIC | Age: 51
End: 2021-04-05

## 2021-04-09 ENCOUNTER — NON-APPOINTMENT (OUTPATIENT)
Age: 51
End: 2021-04-09

## 2021-04-12 ENCOUNTER — NON-APPOINTMENT (OUTPATIENT)
Age: 51
End: 2021-04-12

## 2021-04-30 ENCOUNTER — NON-APPOINTMENT (OUTPATIENT)
Age: 51
End: 2021-04-30

## 2021-05-10 PROBLEM — M17.0 BILATERAL PRIMARY OSTEOARTHRITIS OF KNEE: Chronic | Status: ACTIVE | Noted: 2021-03-08

## 2021-05-10 PROBLEM — M79.7 FIBROMYALGIA: Chronic | Status: ACTIVE | Noted: 2021-03-24

## 2021-05-10 PROBLEM — M41.127: Chronic | Status: ACTIVE | Noted: 2021-03-24

## 2021-05-10 PROBLEM — N20.0 CALCULUS OF KIDNEY: Chronic | Status: ACTIVE | Noted: 2021-03-08

## 2021-05-18 ENCOUNTER — APPOINTMENT (OUTPATIENT)
Dept: ORTHOPEDIC SURGERY | Facility: CLINIC | Age: 51
End: 2021-05-18
Payer: COMMERCIAL

## 2021-05-18 PROCEDURE — 73562 X-RAY EXAM OF KNEE 3: CPT | Mod: RT

## 2021-05-18 PROCEDURE — 99024 POSTOP FOLLOW-UP VISIT: CPT

## 2021-05-18 NOTE — HISTORY OF PRESENT ILLNESS
[de-identified] : 7.5 wks s/p R TKA (3/25/21) [de-identified] : This is a 51F w/ hx of fibromyalgia, congenital leg length discrepancy status post left total hip arthroplasty (2005, Dr. Pitt, [place]), who returns for FU, now 7.5 wks s/p R TKA. Has been overall doing very well, apart from a light fall yesterday. She was able to get up and walk after the fall and is already feeling much better, but says she has now been using a walker for support, previously using a cane.  She continues to use a left shoe lift as well.  She no longer has any pain at rest and mild pain with ambulation.  She takes Duexis and Tylenol, prescribed by her pain management doctor.  She no longer is taking the aspirin.  Otherwise denies any fevers or wound drainage. [de-identified] : NAD AOX3\par \par RLE:\par \par Incisional scar well-healed, CDI. Mild effusion. No swelling, or ecchymosis. ROM: 0-130 degrees w/o pain. No varus/valgus instability. no joint line TTP. No TTP over quadriceps/patellar tendon. No TTP over tibial tubercle or pes insertion. No palpable masses. No lymphedema.\par Alignment: Neutral\par EHL/FHL/GS/TA 5/5. S/S/SP/DP/T SILT (mild numbness lateral to incision). Toes warm, BCR. Compartments soft.\par  [de-identified] : 5/18/2021–right knee x-rays (AP, lateral, sunrise): Well-positioned components without loosening, periprosthetic fracture, or dislocation.  Well aligned. [de-identified] : This is a 51-year-old female doing very well now 7-1/2 weeks status post right total knee arthroplasty.  She is very satisfied with outcome of her procedure.  She may resume WBAT and PT without restrictions.  Follow-up in 6 months or earlier as needed.

## 2021-09-30 ENCOUNTER — APPOINTMENT (OUTPATIENT)
Dept: ORTHOPEDIC SURGERY | Facility: CLINIC | Age: 51
End: 2021-09-30
Payer: MEDICARE

## 2021-09-30 ENCOUNTER — APPOINTMENT (OUTPATIENT)
Dept: ORTHOPEDIC SURGERY | Facility: CLINIC | Age: 51
End: 2021-09-30

## 2021-09-30 VITALS
OXYGEN SATURATION: 100 % | DIASTOLIC BLOOD PRESSURE: 85 MMHG | HEART RATE: 75 BPM | WEIGHT: 165 LBS | BODY MASS INDEX: 31.15 KG/M2 | SYSTOLIC BLOOD PRESSURE: 132 MMHG | HEIGHT: 61 IN

## 2021-09-30 DIAGNOSIS — M79.672 PAIN IN LEFT FOOT: ICD-10-CM

## 2021-09-30 DIAGNOSIS — R22.42 LOCALIZED SWELLING, MASS AND LUMP, LEFT LOWER LIMB: ICD-10-CM

## 2021-09-30 PROCEDURE — 73630 X-RAY EXAM OF FOOT: CPT | Mod: LT

## 2021-09-30 PROCEDURE — 99214 OFFICE O/P EST MOD 30 MIN: CPT

## 2021-09-30 NOTE — DISCUSSION/SUMMARY
[de-identified] : This is a 51-year-old female with an indeterminate left dorsal foot mass.  This likely represents local synovitis although I have recommended an MRI with and without contrast for further assessment.  I have also recommended that she increase her shoe lift to 3 cm total.  I have given her a prescription to continue to work on physical therapy for her right knee for strengthening. Meloxicam prescribed PRN. Side effects reviewed.

## 2021-09-30 NOTE — PHYSICAL EXAM
[FreeTextEntry1] : General: well nourished, in no acute distress, alert and oriented to person, place and time.\par Psychiatric: normal mood and affect, no abnormal movements or speech patterns.\par Eyes: vision intact without deficits, sclera and conjunctiva were normal, pupils were equal in size. \par ENT: Ears and nose were normal in appearance. No thyromegaly.\par Lymph: no enlarged nodes, no lymphedema in extremity.\par Respiratory: Normal respiratory rhythm and effort. No wheezing detected without auscultation. No shortness of breath or respiratory distress.\par Cardiac: no cardiac related leg swelling.\par Neurology: normal gross sensation in extremities to light touch.\par Abdomen: soft, non-tender, tympanic, no masses.\par \par \par RLE:\par \par Incision well-healed. Full painless ROM: 0-130.\par EHL/FHL/GS/TA 5/5. S/S/SP/DP/T SILT. Toes warm, BCR. Compartments soft.\par \par LLE:\par \par Skin: Clean, dry, intact\par Inspection: No obvious malalignment. +bunion. +3cm soft tissue mass, soft, mobile over the dorsum of the foot over the extensor tendons. +TTP. \par ROM: Full ankle dorsi flexion 20, degrees plantar flexion 40° normal subtalar motion. \par Stability: Negative anterior/posterior drawer.\par Strength: 5/5 TA/GS/EHL\par Sensation: Intact to light touch in dp/sp/tib/garima/saph distributions\par Pulses: 2+ DP/PT pulses\par 3cm LLD (L shorter than R)

## 2021-09-30 NOTE — DATA REVIEWED
[de-identified] : 9/30/2021–left foot x-rays (AP, lateral, oblique): + Bunion deformity.  No fractures or dislocations.  No obvious osseous lesions.

## 2021-09-30 NOTE — HISTORY OF PRESENT ILLNESS
[FreeTextEntry1] : This is a 51F w/ hx of fibromyalgia, congenital leg length discrepancy status post left total hip arthroplasty (2005, Dr. Pitt, [place]), s/p R TKA (3/25/21-[facility]), presenting for evaluation of L foot pain/mass which began 3 weeks ago without any trauma.  She says the pain is constant, 5 out of 10 in severity.  Has tried Duexis and Tylenol with minimal relief.  With regards to her right knee she is doing very well and reports excellent range of motion without pain.  She does note a heaviness to her knee but otherwise is doing well.  She also notes that she feels her left shoe lift is not high enough.

## 2021-10-11 ENCOUNTER — APPOINTMENT (OUTPATIENT)
Dept: MRI IMAGING | Facility: CLINIC | Age: 51
End: 2021-10-11

## 2021-10-28 ENCOUNTER — APPOINTMENT (OUTPATIENT)
Dept: MRI IMAGING | Facility: CLINIC | Age: 51
End: 2021-10-28
Payer: MEDICARE

## 2021-10-28 ENCOUNTER — OUTPATIENT (OUTPATIENT)
Dept: OUTPATIENT SERVICES | Facility: HOSPITAL | Age: 51
LOS: 1 days | End: 2021-10-28

## 2021-10-28 DIAGNOSIS — M79.672 PAIN IN LEFT FOOT: ICD-10-CM

## 2021-10-28 DIAGNOSIS — Z96.642 PRESENCE OF LEFT ARTIFICIAL HIP JOINT: Chronic | ICD-10-CM

## 2021-10-28 DIAGNOSIS — Z98.84 BARIATRIC SURGERY STATUS: Chronic | ICD-10-CM

## 2021-10-28 DIAGNOSIS — Z90.89 ACQUIRED ABSENCE OF OTHER ORGANS: Chronic | ICD-10-CM

## 2021-10-28 DIAGNOSIS — Z98.890 OTHER SPECIFIED POSTPROCEDURAL STATES: Chronic | ICD-10-CM

## 2021-10-28 PROCEDURE — 73720 MRI LWR EXTREMITY W/O&W/DYE: CPT | Mod: 26,LT

## 2021-11-02 ENCOUNTER — APPOINTMENT (OUTPATIENT)
Dept: OBGYN | Facility: CLINIC | Age: 51
End: 2021-11-02
Payer: MEDICARE

## 2021-11-02 VITALS — DIASTOLIC BLOOD PRESSURE: 70 MMHG | SYSTOLIC BLOOD PRESSURE: 123 MMHG

## 2021-11-02 DIAGNOSIS — Z87.42 PERSONAL HISTORY OF OTHER DISEASES OF THE FEMALE GENITAL TRACT: ICD-10-CM

## 2021-11-02 DIAGNOSIS — N95.0 POSTMENOPAUSAL BLEEDING: ICD-10-CM

## 2021-11-02 LAB
BILIRUB UR QL STRIP: NORMAL
GLUCOSE UR-MCNC: NORMAL
HCG UR QL: 0.2 EU/DL
HGB UR QL STRIP.AUTO: NORMAL
KETONES UR-MCNC: NORMAL
LEUKOCYTE ESTERASE UR QL STRIP: NORMAL
NITRITE UR QL STRIP: NORMAL
PH UR STRIP: 6
PROT UR STRIP-MCNC: NORMAL
SP GR UR STRIP: 1.02

## 2021-11-02 PROCEDURE — 81003 URINALYSIS AUTO W/O SCOPE: CPT | Mod: QW

## 2021-11-02 PROCEDURE — 99204 OFFICE O/P NEW MOD 45 MIN: CPT

## 2021-11-02 NOTE — PHYSICAL EXAM
[Chaperone Declined] : Patient declined chaperone [Labia Majora] : normal [Labia Minora] : normal [Normal] : normal [Uterine Adnexae] : normal [FreeTextEntry4] : small amount of dark blood  noticed in vagina

## 2021-11-02 NOTE — HISTORY OF PRESENT ILLNESS
[FreeTextEntry1] : Patient is a 51-year-old female presents for complaints of an episode of abnormal uterine bleeding. Patient states this was like having a period which lasted for several days. Patient's last menstrual period was 2019. Patient had her last GYN exam approximate 6 years ago. Discussed this issue with the patient in detail advised the following: Schedule annual exam and Pap smear, pelvic sonogram, hormonal lab evaluation, an office hysteroscopy and endometrial biopsy, mammogram, bone density DEXA. This benefits and alternatives discussed with the patient in detail. Questions answered

## 2021-11-06 LAB
BASOPHILS # BLD AUTO: 0.04 K/UL
BASOPHILS NFR BLD AUTO: 0.7 %
EOSINOPHIL # BLD AUTO: 0.05 K/UL
EOSINOPHIL NFR BLD AUTO: 0.9 %
ESTRADIOL SERPL-MCNC: 447 PG/ML
FSH SERPL-MCNC: 25.2 IU/L
HCT VFR BLD CALC: 42.4 %
HGB BLD-MCNC: 13.9 G/DL
IMM GRANULOCYTES NFR BLD AUTO: 0.4 %
LYMPHOCYTES # BLD AUTO: 1.5 K/UL
LYMPHOCYTES NFR BLD AUTO: 27.2 %
MAN DIFF?: NORMAL
MCHC RBC-ENTMCNC: 31.4 PG
MCHC RBC-ENTMCNC: 32.8 GM/DL
MCV RBC AUTO: 95.9 FL
MONOCYTES # BLD AUTO: 0.49 K/UL
MONOCYTES NFR BLD AUTO: 8.9 %
NEUTROPHILS # BLD AUTO: 3.42 K/UL
NEUTROPHILS NFR BLD AUTO: 61.9 %
PLATELET # BLD AUTO: 352 K/UL
RBC # BLD: 4.42 M/UL
RBC # FLD: 13.6 %
TSH SERPL-ACNC: 1.44 UIU/ML
WBC # FLD AUTO: 5.52 K/UL

## 2021-11-11 ENCOUNTER — APPOINTMENT (OUTPATIENT)
Dept: ULTRASOUND IMAGING | Facility: CLINIC | Age: 51
End: 2021-11-11
Payer: MEDICARE

## 2021-11-11 ENCOUNTER — APPOINTMENT (OUTPATIENT)
Dept: MAMMOGRAPHY | Facility: CLINIC | Age: 51
End: 2021-11-11
Payer: MEDICARE

## 2021-11-11 ENCOUNTER — RESULT REVIEW (OUTPATIENT)
Age: 51
End: 2021-11-11

## 2021-11-11 ENCOUNTER — OUTPATIENT (OUTPATIENT)
Dept: OUTPATIENT SERVICES | Facility: HOSPITAL | Age: 51
LOS: 1 days | End: 2021-11-11
Payer: MEDICARE

## 2021-11-11 DIAGNOSIS — Z96.642 PRESENCE OF LEFT ARTIFICIAL HIP JOINT: Chronic | ICD-10-CM

## 2021-11-11 DIAGNOSIS — Z90.89 ACQUIRED ABSENCE OF OTHER ORGANS: Chronic | ICD-10-CM

## 2021-11-11 DIAGNOSIS — Z87.42 PERSONAL HISTORY OF OTHER DISEASES OF THE FEMALE GENITAL TRACT: ICD-10-CM

## 2021-11-11 DIAGNOSIS — Z98.84 BARIATRIC SURGERY STATUS: Chronic | ICD-10-CM

## 2021-11-11 DIAGNOSIS — Z98.890 OTHER SPECIFIED POSTPROCEDURAL STATES: Chronic | ICD-10-CM

## 2021-11-11 PROCEDURE — 76856 US EXAM PELVIC COMPLETE: CPT | Mod: 26

## 2021-11-11 PROCEDURE — 76830 TRANSVAGINAL US NON-OB: CPT | Mod: 26

## 2021-11-11 PROCEDURE — 76856 US EXAM PELVIC COMPLETE: CPT

## 2021-11-11 PROCEDURE — 76830 TRANSVAGINAL US NON-OB: CPT

## 2021-11-12 ENCOUNTER — APPOINTMENT (OUTPATIENT)
Dept: ORTHOPEDIC SURGERY | Facility: CLINIC | Age: 51
End: 2021-11-12
Payer: MEDICARE

## 2021-11-12 VITALS — WEIGHT: 165 LBS | BODY MASS INDEX: 31.15 KG/M2 | HEIGHT: 61 IN

## 2021-11-12 DIAGNOSIS — M77.8 OTHER ENTHESOPATHIES, NOT ELSEWHERE CLASSIFIED: ICD-10-CM

## 2021-11-12 DIAGNOSIS — M62.89 OTHER SPECIFIED DISORDERS OF MUSCLE: ICD-10-CM

## 2021-11-12 DIAGNOSIS — M21.41 FLAT FOOT [PES PLANUS] (ACQUIRED), RIGHT FOOT: ICD-10-CM

## 2021-11-12 DIAGNOSIS — M19.079 PRIMARY OSTEOARTHRITIS, UNSPECIFIED ANKLE AND FOOT: ICD-10-CM

## 2021-11-12 DIAGNOSIS — M25.775 OSTEOPHYTE, LEFT FOOT: ICD-10-CM

## 2021-11-12 DIAGNOSIS — M21.42 FLAT FOOT [PES PLANUS] (ACQUIRED), RIGHT FOOT: ICD-10-CM

## 2021-11-12 PROCEDURE — 99214 OFFICE O/P EST MOD 30 MIN: CPT

## 2021-11-12 RX ORDER — DICLOFENAC SODIUM 1% 10 MG/G
1 GEL TOPICAL
Qty: 1 | Refills: 2 | Status: ACTIVE | COMMUNITY
Start: 2021-11-12 | End: 1900-01-01

## 2021-11-15 ENCOUNTER — NON-APPOINTMENT (OUTPATIENT)
Age: 51
End: 2021-11-15

## 2021-11-17 ENCOUNTER — NON-APPOINTMENT (OUTPATIENT)
Age: 51
End: 2021-11-17

## 2021-11-19 ENCOUNTER — NON-APPOINTMENT (OUTPATIENT)
Age: 51
End: 2021-11-19

## 2021-11-29 ENCOUNTER — APPOINTMENT (OUTPATIENT)
Dept: MAMMOGRAPHY | Facility: CLINIC | Age: 51
End: 2021-11-29

## 2021-11-30 ENCOUNTER — APPOINTMENT (OUTPATIENT)
Dept: OBGYN | Facility: CLINIC | Age: 51
End: 2021-11-30

## 2021-12-07 ENCOUNTER — APPOINTMENT (OUTPATIENT)
Dept: OBGYN | Facility: CLINIC | Age: 51
End: 2021-12-07

## 2021-12-13 ENCOUNTER — APPOINTMENT (OUTPATIENT)
Dept: OBGYN | Facility: CLINIC | Age: 51
End: 2021-12-13

## 2022-02-07 ENCOUNTER — NON-APPOINTMENT (OUTPATIENT)
Age: 52
End: 2022-02-07

## 2022-02-08 ENCOUNTER — APPOINTMENT (OUTPATIENT)
Dept: OBGYN | Facility: CLINIC | Age: 52
End: 2022-02-08

## 2022-04-19 ENCOUNTER — APPOINTMENT (OUTPATIENT)
Dept: ORTHOPEDIC SURGERY | Facility: CLINIC | Age: 52
End: 2022-04-19
Payer: MEDICARE

## 2022-04-22 ENCOUNTER — APPOINTMENT (OUTPATIENT)
Dept: ORTHOPEDIC SURGERY | Facility: CLINIC | Age: 52
End: 2022-04-22
Payer: MEDICARE

## 2022-04-22 VITALS
SYSTOLIC BLOOD PRESSURE: 124 MMHG | DIASTOLIC BLOOD PRESSURE: 79 MMHG | HEIGHT: 59 IN | HEART RATE: 126 BPM | BODY MASS INDEX: 31.65 KG/M2 | WEIGHT: 157 LBS

## 2022-04-22 DIAGNOSIS — M17.11 UNILATERAL PRIMARY OSTEOARTHRITIS, RIGHT KNEE: ICD-10-CM

## 2022-04-22 PROCEDURE — 73560 X-RAY EXAM OF KNEE 1 OR 2: CPT | Mod: RT

## 2022-04-22 PROCEDURE — 99213 OFFICE O/P EST LOW 20 MIN: CPT

## 2022-05-23 ENCOUNTER — NON-APPOINTMENT (OUTPATIENT)
Age: 52
End: 2022-05-23

## 2022-05-24 ENCOUNTER — APPOINTMENT (OUTPATIENT)
Dept: OBGYN | Facility: CLINIC | Age: 52
End: 2022-05-24
Payer: MEDICARE

## 2022-05-24 VITALS
HEIGHT: 59 IN | TEMPERATURE: 98 F | DIASTOLIC BLOOD PRESSURE: 72 MMHG | BODY MASS INDEX: 32.05 KG/M2 | WEIGHT: 159 LBS | SYSTOLIC BLOOD PRESSURE: 128 MMHG

## 2022-05-24 DIAGNOSIS — N95.1 MENOPAUSAL AND FEMALE CLIMACTERIC STATES: ICD-10-CM

## 2022-05-24 DIAGNOSIS — N95.2 POSTMENOPAUSAL ATROPHIC VAGINITIS: ICD-10-CM

## 2022-05-24 DIAGNOSIS — N94.10 UNSPECIFIED DYSPAREUNIA: ICD-10-CM

## 2022-05-24 DIAGNOSIS — Z12.4 ENCOUNTER FOR SCREENING FOR MALIGNANT NEOPLASM OF CERVIX: ICD-10-CM

## 2022-05-24 DIAGNOSIS — R92.2 INCONCLUSIVE MAMMOGRAM: ICD-10-CM

## 2022-05-24 DIAGNOSIS — N83.292 OTHER OVARIAN CYST, LEFT SIDE: ICD-10-CM

## 2022-05-24 DIAGNOSIS — Z12.39 ENCOUNTER FOR OTHER SCREENING FOR MALIGNANT NEOPLASM OF BREAST: ICD-10-CM

## 2022-05-24 DIAGNOSIS — Z01.411 ENCOUNTER FOR GYNECOLOGICAL EXAMINATION (GENERAL) (ROUTINE) WITH ABNORMAL FINDINGS: ICD-10-CM

## 2022-05-24 PROCEDURE — G0101: CPT

## 2022-05-24 PROCEDURE — 36415 COLL VENOUS BLD VENIPUNCTURE: CPT

## 2022-05-24 RX ORDER — MISOPROSTOL 200 UG/1
200 TABLET ORAL
Qty: 2 | Refills: 0 | Status: DISCONTINUED | COMMUNITY
Start: 2021-11-02 | End: 2022-05-24

## 2022-05-24 RX ORDER — TRAMADOL HYDROCHLORIDE 50 MG/1
50 TABLET, COATED ORAL
Qty: 21 | Refills: 0 | Status: DISCONTINUED | COMMUNITY
Start: 2021-03-15 | End: 2022-05-24

## 2022-05-24 RX ORDER — MELOXICAM 15 MG/1
15 TABLET ORAL
Qty: 30 | Refills: 1 | Status: DISCONTINUED | COMMUNITY
Start: 2021-09-30 | End: 2022-05-24

## 2022-05-24 NOTE — DISCUSSION/SUMMARY
[FreeTextEntry1] : 1) pap performed\par 2) significant vulvovaginal atrophy with stenosis noted.  Discussed that recurrent UTIs could be 2nd to decreased vaginal estrogen and a result of menopause. Discussed risks/benefits of vaginal estrogen. She is amenable to trialing. she was advised to apply aquaphor to area where recurrent tear occurs\par 3) Serum hormone levels assessed today. Last estradiol level was high at 447 with an FSH of 25.2\par 4) pt advised of importance of screening mammo/sono, particularly prior to starting any systemic HRT. Rx issued\par 5) Rx issued for DEXA scan\par 6) Rx for f/u TV sono issued for surveillance of left ovarian cyst and endometrium\par 7) patient advised to f/u with PCP for annual/blood work and GI for screening colonoscopy\par \par She was advised to return to the office to discuss the above results and possible HRT for relief of menopausal symptoms noted above\par

## 2022-05-24 NOTE — HISTORY OF PRESENT ILLNESS
[Currently In Menopause] : currently in menopause [Currently Active] : currently active [TextBox_4] : Zaria is a 51 y/o  who presents today for an annual exam with c/o recurrent UTIs s/p intercourse. She states she has been going to [facility], an online MD. she was treated based on symptoms, no cultures were ever obtained.  She states she has developed an allergy to Bactrim.  \par \par She also has c/o vaginal tearing s/p sex. She states it occurs in the same spot each time.\par \par She also has c/o insomnia and hot flashes \par She has not had a mammogram since her 30's. She has never had a colonoscopy. Last pap was years ago.\par \par she was last seen in this office as a new patient on 2021 with c/o what appeared to be post menopausal bleeding as she stated she had a menses since 2019. MK had advised pelvic sonogram and hysteroscopy/endo bx . Pt had the pelvic imaging which noted a 3mm endometrium and a left 3+cm simple ovarian cyst.  She never returned for the hyst/endo bx. She denies any bleeding since that time. [TextBox_19] : years ago [ColonoscopyDate] : NEVER

## 2022-05-24 NOTE — PHYSICAL EXAM
[Appropriately responsive] : appropriately responsive [Alert] : alert [No Acute Distress] : no acute distress [Oriented x3] : oriented x3 [Breast Palpation Diffuse Fibrous Tissue Bilateral] : fibrocystic changes [No Masses] : no breast masses were palpable [Labia Majora] : normal [Labia Minora] : normal [Normal] : normal [Atrophy] : atrophy [FreeTextEntry4] : significant vulvovaginal atrophy [FreeTextEntry5] : unable to visualize 2nd to vaginal stenosis [FreeTextEntry6] : unable to properly evaluate with bi-manual 2nd to signifcat vaginal stenosis

## 2022-05-25 LAB
ALBUMIN SERPL ELPH-MCNC: 5 G/DL
ALP BLD-CCNC: 82 U/L
ALT SERPL-CCNC: 10 U/L
ANION GAP SERPL CALC-SCNC: 17 MMOL/L
AST SERPL-CCNC: 16 U/L
BILIRUB SERPL-MCNC: 0.2 MG/DL
BUN SERPL-MCNC: 22 MG/DL
CALCIUM SERPL-MCNC: 10.1 MG/DL
CHLORIDE SERPL-SCNC: 102 MMOL/L
CO2 SERPL-SCNC: 24 MMOL/L
CREAT SERPL-MCNC: 0.64 MG/DL
EGFR: 106 ML/MIN/1.73M2
ESTRADIOL SERPL-MCNC: 8 PG/ML
FSH SERPL-MCNC: 136 IU/L
GLUCOSE SERPL-MCNC: 53 MG/DL
LH SERPL-ACNC: 81.5 IU/L
POTASSIUM SERPL-SCNC: 4.8 MMOL/L
PROLACTIN SERPL-MCNC: 13.1 NG/ML
PROT SERPL-MCNC: 7.4 G/DL
SODIUM SERPL-SCNC: 143 MMOL/L
T3FREE SERPL-MCNC: 2.94 PG/ML
T4 FREE SERPL-MCNC: 1.4 NG/DL
TSH SERPL-ACNC: 1.58 UIU/ML

## 2022-05-26 LAB — CYTOLOGY CVX/VAG DOC THIN PREP: NORMAL

## 2022-05-27 LAB
TESTOST FREE SERPL-MCNC: 0.6 PG/ML
TESTOST SERPL-MCNC: 4 NG/DL

## 2022-05-31 ENCOUNTER — APPOINTMENT (OUTPATIENT)
Dept: MAMMOGRAPHY | Facility: CLINIC | Age: 52
End: 2022-05-31
Payer: MEDICARE

## 2022-05-31 ENCOUNTER — OUTPATIENT (OUTPATIENT)
Dept: OUTPATIENT SERVICES | Facility: HOSPITAL | Age: 52
LOS: 1 days | End: 2022-05-31

## 2022-05-31 ENCOUNTER — OUTPATIENT (OUTPATIENT)
Dept: OUTPATIENT SERVICES | Facility: HOSPITAL | Age: 52
LOS: 1 days | End: 2022-05-31
Payer: MEDICARE

## 2022-05-31 ENCOUNTER — RESULT REVIEW (OUTPATIENT)
Age: 52
End: 2022-05-31

## 2022-05-31 ENCOUNTER — APPOINTMENT (OUTPATIENT)
Dept: ULTRASOUND IMAGING | Facility: CLINIC | Age: 52
End: 2022-05-31
Payer: MEDICARE

## 2022-05-31 ENCOUNTER — APPOINTMENT (OUTPATIENT)
Dept: RADIOLOGY | Facility: CLINIC | Age: 52
End: 2022-05-31
Payer: MEDICARE

## 2022-05-31 DIAGNOSIS — Z98.84 BARIATRIC SURGERY STATUS: Chronic | ICD-10-CM

## 2022-05-31 DIAGNOSIS — Z90.89 ACQUIRED ABSENCE OF OTHER ORGANS: Chronic | ICD-10-CM

## 2022-05-31 DIAGNOSIS — Z96.642 PRESENCE OF LEFT ARTIFICIAL HIP JOINT: Chronic | ICD-10-CM

## 2022-05-31 DIAGNOSIS — Z98.890 OTHER SPECIFIED POSTPROCEDURAL STATES: Chronic | ICD-10-CM

## 2022-05-31 DIAGNOSIS — Z12.39 ENCOUNTER FOR OTHER SCREENING FOR MALIGNANT NEOPLASM OF BREAST: ICD-10-CM

## 2022-05-31 DIAGNOSIS — Z87.42 PERSONAL HISTORY OF OTHER DISEASES OF THE FEMALE GENITAL TRACT: ICD-10-CM

## 2022-05-31 PROCEDURE — 77067 SCR MAMMO BI INCL CAD: CPT | Mod: 26

## 2022-05-31 PROCEDURE — 77085 DXA BONE DENSITY AXL VRT FX: CPT

## 2022-05-31 PROCEDURE — 77063 BREAST TOMOSYNTHESIS BI: CPT | Mod: 26

## 2022-05-31 PROCEDURE — 76830 TRANSVAGINAL US NON-OB: CPT | Mod: 26

## 2022-05-31 PROCEDURE — 77063 BREAST TOMOSYNTHESIS BI: CPT

## 2022-05-31 PROCEDURE — 77085 DXA BONE DENSITY AXL VRT FX: CPT | Mod: 26

## 2022-05-31 PROCEDURE — 76830 TRANSVAGINAL US NON-OB: CPT

## 2022-05-31 PROCEDURE — 77067 SCR MAMMO BI INCL CAD: CPT

## 2022-06-01 ENCOUNTER — NON-APPOINTMENT (OUTPATIENT)
Age: 52
End: 2022-06-01

## 2022-06-01 LAB — DHEA-SULFATE, SERUM: 50 UG/DL

## 2022-06-07 ENCOUNTER — NON-APPOINTMENT (OUTPATIENT)
Age: 52
End: 2022-06-07

## 2022-06-08 ENCOUNTER — APPOINTMENT (OUTPATIENT)
Dept: OBGYN | Facility: CLINIC | Age: 52
End: 2022-06-08

## 2022-07-12 ENCOUNTER — APPOINTMENT (OUTPATIENT)
Dept: OBGYN | Facility: CLINIC | Age: 52
End: 2022-07-12

## 2022-07-12 VITALS
HEIGHT: 59 IN | TEMPERATURE: 98 F | DIASTOLIC BLOOD PRESSURE: 77 MMHG | WEIGHT: 157.25 LBS | HEART RATE: 88 BPM | RESPIRATION RATE: 22 BRPM | BODY MASS INDEX: 31.7 KG/M2 | SYSTOLIC BLOOD PRESSURE: 130 MMHG

## 2022-07-12 DIAGNOSIS — Z87.39 PERSONAL HISTORY OF OTHER DISEASES OF THE MUSCULOSKELETAL SYSTEM AND CONNECTIVE TISSUE: ICD-10-CM

## 2022-07-12 DIAGNOSIS — M81.0 AGE-RELATED OSTEOPOROSIS W/OUT CURRENT PATHOLOGICAL FRACTURE: ICD-10-CM

## 2022-07-12 DIAGNOSIS — M85.80 OTHER SPECIFIED DISORDERS OF BONE DENSITY AND STRUCTURE, UNSPECIFIED SITE: ICD-10-CM

## 2022-07-12 PROCEDURE — 99214 OFFICE O/P EST MOD 30 MIN: CPT

## 2022-07-12 RX ORDER — NITROFURANTOIN (MONOHYDRATE/MACROCRYSTALS) 25; 75 MG/1; MG/1
100 CAPSULE ORAL
Qty: 10 | Refills: 0 | Status: ACTIVE | COMMUNITY
Start: 2022-04-24

## 2022-07-12 NOTE — HISTORY OF PRESENT ILLNESS
[FreeTextEntry1] : 53 yo  with LMP 2018.\par \par Pt has hormones checked. '21 normal but '22 low estrogen and high FSH.\par \par Pt had bariatric sx 2016.  She has low Vit D and B12\par \par vitamin D 1000 IUs/day\par B12 will be supplement by IM injection with Dr Kai Mari\par \par  [ColonoscopyDate] : none

## 2022-07-12 NOTE — DISCUSSION/SUMMARY
[FreeTextEntry1] : Osteoporosis:\par femoral neck\par Osteopenia:\par hip\par Pt had left hip replacement.\par Estrogen's use in bone health discussed with risks.\par Prior to using any meds, would increase exercise.\par Vit D 1000 IUs daily, calcium of 1100mg daily thru diet of dark green leafy vegetables, low-fat milk products and exercise TIW.\par Repeat BDS 2 yrs\par \par Menopause:\par fatigue, hot flashes, low libido, joint pain, anxiety\par Prior treatment for anxiety Klonopin and Prozac\par Suggest return to psychiatrist\par Omega3 Fish oil\par Probiotic [place] Acidophilus\par Misa root\par If pt interested in bio-identical hormones, return to Dr Leon. If desires HRT, return for discussion.\par \par \par Needs colonoscopy

## 2022-10-26 ENCOUNTER — APPOINTMENT (OUTPATIENT)
Dept: PHYSICAL MEDICINE AND REHAB | Facility: CLINIC | Age: 52
End: 2022-10-26

## 2022-10-27 ENCOUNTER — APPOINTMENT (OUTPATIENT)
Dept: PHYSICAL MEDICINE AND REHAB | Facility: CLINIC | Age: 52
End: 2022-10-27

## 2022-10-27 PROCEDURE — 99205 OFFICE O/P NEW HI 60 MIN: CPT

## 2022-10-27 RX ORDER — NALOXONE HYDROCHLORIDE 4 MG/.1ML
4 SPRAY NASAL
Qty: 1 | Refills: 1 | Status: ACTIVE | COMMUNITY
Start: 2022-10-27 | End: 1900-01-01

## 2022-10-27 NOTE — ASSESSMENT
[FreeTextEntry1] : 52 y.o. F w/ h/o congenital hip dysplasia, LLD, s/p L NIRMALA (2005) and R TKA (2020) w/ CLBP and multifactorial gait dysfunction.  I spent most of today's office visit (45 min) discussing etiology, pathogenesis, further diagnostic work-up and non-operative management.  Will obtain CT bone length study (both w/ and w/o shoe lift) to measure patient's pelvic obliquity.  Discussed utility of diagnostic SI joint LA blocks under fluoroscopy to better ascertain her primary pain generator.  If patient achieves > 70-80% relief of painful sxs, would then consider referral for lateral branch RFA.  Would not recommend LESI or CSI SI joints as patient's pathomechanics cannot be easily corrected w/ bracing and DME and prior injections have been unsuccessful.  Explained that patient's b/l DF weakness may be 2' undiagnosed lumbar canal stenosis even though she may have suffered left sciatic mononeuropathy s/p NIRMALA.  I asked the patient to obtain copies of her most recent MRI L-spine from 2020 for my review to confirm.  Will further consider EDX testing to r/o underlying peripheral polyneuropathy as a cause of her b/l DF weakness -> gait dysfunction.  I cautioned the patient against chronic use of high dose PO NSAIDs and acetaminophen 2' GI/cardiac/renal toxicities.  Although pt. does not want to go on long term narcotic analgesics, she is asking me for pain medication.  Discussed R/B/A to trial of tramadol 50 mg; one tab po bid prn.  #30.  Educated on CNS, respiratory and GI dysfunction.  I-STOP database checked.  Pt. is in agreement with plan.  All questions answered. RTC after CT and SI joint blocks.

## 2022-10-27 NOTE — PHYSICAL EXAM
[FreeTextEntry1] : NAD\par A&Ox3\par Non-obese\par Inspection\par ROM L-spine: 5-10' passive extension (painful)\par ROM Hips: smooth IR/ER w/o pain\par Pelvic tilt: ++\par Seated slump test:\par SLR: neg \par DTR's: 2+ knees; depressed ankles\par MMT: 4/5 B/L TA; 5/5 B/L GS\par Sensation: SILT\par Toe & Heel Walk: Deferred\par Palpation: b/l SI joints VTTP (concordant)\par Gait: vaults over right leg w/ hyperextension left knee

## 2022-10-27 NOTE — HISTORY OF PRESENT ILLNESS
[FreeTextEntry1] : 52 y.o. F w/ h/o congenital hip dysplasia, LLD, s/p L NIRMALA (2005) and R TKA (2020) presents to office w/ c/o CLBP for greater than 10 years.  Pt. states that her LBP may have worsened after her NIRMALA and certainly after her TKA.  Pt. denies residual pain in her replaced left hip and right knee.  Of note, pt. states that she developed foot drop after her L NIRMALA.  Pt. states that her LLD is about 1" where her right leg is longer than left.  Still walks w/ aide of SC.  Uses lift in left shoe.  Pt. describes mostly lumbosacral back pain w/ radiation into posterolateral aspect left thigh not past knee.  Denies N/T/B in legs or feet.  X-rays and MRI L-spine done in 2020.  Last course of P.T. this past spring/summer which was not helpful and seemed to exacerbate her pain.  Takes OTC ibuprofen 800 mg po tid + OTC acetaminophen 1000 mg tid.  Endorses gastritis.  Pt. has LESI x2 Dec 2020 with fleeting relief of sxs.  No other spinal injections.

## 2022-11-03 ENCOUNTER — APPOINTMENT (OUTPATIENT)
Dept: CT IMAGING | Facility: CLINIC | Age: 52
End: 2022-11-03

## 2022-11-08 ENCOUNTER — APPOINTMENT (OUTPATIENT)
Dept: CT IMAGING | Facility: CLINIC | Age: 52
End: 2022-11-08

## 2022-11-08 ENCOUNTER — OUTPATIENT (OUTPATIENT)
Dept: OUTPATIENT SERVICES | Facility: HOSPITAL | Age: 52
LOS: 1 days | End: 2022-11-08
Payer: MEDICARE

## 2022-11-08 DIAGNOSIS — Z98.890 OTHER SPECIFIED POSTPROCEDURAL STATES: Chronic | ICD-10-CM

## 2022-11-08 DIAGNOSIS — Z96.642 PRESENCE OF LEFT ARTIFICIAL HIP JOINT: Chronic | ICD-10-CM

## 2022-11-08 DIAGNOSIS — Z98.84 BARIATRIC SURGERY STATUS: Chronic | ICD-10-CM

## 2022-11-08 DIAGNOSIS — M21.70 UNEQUAL LIMB LENGTH (ACQUIRED), UNSPECIFIED SITE: ICD-10-CM

## 2022-11-08 DIAGNOSIS — Z90.89 ACQUIRED ABSENCE OF OTHER ORGANS: Chronic | ICD-10-CM

## 2022-11-08 PROCEDURE — 77073 BONE LENGTH STUDIES: CPT

## 2022-11-08 PROCEDURE — 77073 BONE LENGTH STUDIES: CPT | Mod: 26

## 2022-11-13 LAB — SARS-COV-2 N GENE NPH QL NAA+PROBE: DETECTED

## 2022-11-14 ENCOUNTER — APPOINTMENT (OUTPATIENT)
Dept: PHYSICAL MEDICINE AND REHAB | Facility: GI CENTER | Age: 52
End: 2022-11-14

## 2022-12-09 ENCOUNTER — NON-APPOINTMENT (OUTPATIENT)
Age: 52
End: 2022-12-09

## 2022-12-09 VITALS
HEIGHT: 59 IN | BODY MASS INDEX: 30.64 KG/M2 | DIASTOLIC BLOOD PRESSURE: 95 MMHG | RESPIRATION RATE: 14 BRPM | SYSTOLIC BLOOD PRESSURE: 156 MMHG | HEART RATE: 69 BPM | WEIGHT: 152 LBS

## 2022-12-11 ENCOUNTER — TRANSCRIPTION ENCOUNTER (OUTPATIENT)
Age: 52
End: 2022-12-11

## 2022-12-12 ENCOUNTER — OUTPATIENT (OUTPATIENT)
Dept: OUTPATIENT SERVICES | Facility: HOSPITAL | Age: 52
LOS: 1 days | End: 2022-12-12
Payer: MEDICARE

## 2022-12-12 ENCOUNTER — APPOINTMENT (OUTPATIENT)
Dept: PHYSICAL MEDICINE AND REHAB | Facility: GI CENTER | Age: 52
End: 2022-12-12

## 2022-12-12 DIAGNOSIS — Z90.89 ACQUIRED ABSENCE OF OTHER ORGANS: Chronic | ICD-10-CM

## 2022-12-12 DIAGNOSIS — Z96.642 PRESENCE OF LEFT ARTIFICIAL HIP JOINT: Chronic | ICD-10-CM

## 2022-12-12 DIAGNOSIS — M53.3 SACROCOCCYGEAL DISORDERS, NOT ELSEWHERE CLASSIFIED: ICD-10-CM

## 2022-12-12 DIAGNOSIS — Z98.890 OTHER SPECIFIED POSTPROCEDURAL STATES: Chronic | ICD-10-CM

## 2022-12-12 DIAGNOSIS — Z98.84 BARIATRIC SURGERY STATUS: Chronic | ICD-10-CM

## 2022-12-12 PROCEDURE — G0260: CPT

## 2022-12-12 PROCEDURE — 27096 INJECT SACROILIAC JOINT: CPT | Mod: 50

## 2022-12-12 PROCEDURE — 76000 FLUOROSCOPY <1 HR PHYS/QHP: CPT

## 2022-12-12 NOTE — PROCEDURE
[de-identified] : Date of Procedure: 12/12/22\par \par PROCEDURE:\par 1) BILATERAL sacroiliac joint injection\par 2) Fluoroscopic needle guidance\par \par REASON FOR PROCEDURE: Sacroiliac joint pain/sacroiliitis\par PHYSICIAN: KHURRAM WEN DO\par MEDICATIONS INJECTED: 0.5 mL Kenalog (20 mg) + 1 mL 1% lidocaine (total per side)\par LOCAL ANESTHETIC INJECTED: 2 mL 1% lidocaine (total per side)\par SEDATION MEDICATIONS: per Anesthesia\par ESTIMATED BLOOD LOSS: None\par COMPLICATIONS: None\par \par TECHNIQUE: Time-out was taken to identify the correct patient, procedure and side prior to starting the procedure. With the patient lying in the prone position, the patient was prepped and draped in the usual sterile fashion using ChloraPrep x3 and sterile OR towels. Each sacroiliac joint was determined under fluoroscopy. Local anesthetic was given by raising a skin wheal and going down to the hub of a 25-G, 1.5-inch needle.  A 3.5-inch, 22-G Quincke needle was then advanced into the above sacroiliac joint.  After a negative aspirate to make sure that there was no intravascular placement, the medications were then injected slowly.\par \par The procedure was completed without complications and was tolerated well.  The patient was monitored after the procedure.  The patient (or responsible party) was given post-procedure and discharge instructions to follow at home.  The patient was discharged in stable condition.  A follow-up appointment was made.\par

## 2023-02-09 ENCOUNTER — APPOINTMENT (OUTPATIENT)
Dept: PHYSICAL MEDICINE AND REHAB | Facility: CLINIC | Age: 53
End: 2023-02-09

## 2023-05-03 ENCOUNTER — APPOINTMENT (OUTPATIENT)
Dept: PHYSICAL MEDICINE AND REHAB | Facility: CLINIC | Age: 53
End: 2023-05-03

## 2023-06-13 ENCOUNTER — APPOINTMENT (OUTPATIENT)
Dept: PHYSICAL MEDICINE AND REHAB | Facility: CLINIC | Age: 53
End: 2023-06-13
Payer: MEDICARE

## 2023-06-13 VITALS
BODY MASS INDEX: 30.84 KG/M2 | WEIGHT: 153.01 LBS | RESPIRATION RATE: 16 BRPM | DIASTOLIC BLOOD PRESSURE: 99 MMHG | HEIGHT: 59 IN | HEART RATE: 88 BPM | SYSTOLIC BLOOD PRESSURE: 155 MMHG

## 2023-06-13 PROCEDURE — 99214 OFFICE O/P EST MOD 30 MIN: CPT

## 2023-06-13 NOTE — HISTORY OF PRESENT ILLNESS
[FreeTextEntry1] : 53 y.o. F w/ h/o congenital hip dysplasia, LLD, s/p L NIRMALA (2005) and R TKA (2020) w/ CLBP and multifactorial gait dysfunction returns to office for f/u s/p b/l SI joint CSI (12/12/22).  The patient reports only a few days of partial symptomatic relief post procedure followed by gradual returns of her painful sxs.  Pt. brings in outside EDX studies for my review.  Pt. states that her LBP is very limiting.  Endorses some N/T in legs and feet.  Recently, started using RW for distances.  Pt. is taking ibuprofen 800 mg po bid; oxycodone 10 mg po bid and gabapentin 300 mg po qhs which she tolerates well.

## 2023-06-13 NOTE — PHYSICAL EXAM
[FreeTextEntry1] : NAD\par A&Ox3\par Non-obese\par Inspection\par ROM L-spine: 5-10' passive extension (painful)\par ROM Hips: right hip smooth IR/ER w/o pain; left hip restricted IR/ER\par Pelvic tilt: ++\par Seated slump test: neg b/l\par SLR: neg b/l\par DTR's: 2+ knees; 1+ ankles (static)\par MMT: 4/5 B/L TA (static); 5/5 B/L GS (static)\par Sensation: SILT\par Toe & Heel Walk: Deferred\par Palpation: b/l SI joints VTTP (concordant)\par Gait: vaults over right leg w/ hyperextension left knee (static)

## 2023-06-13 NOTE — ASSESSMENT
[FreeTextEntry1] : 53 y.o. F w/ h/o congenital hip dysplasia, LLD, s/p L NIRMALA (2005) and R TKA (2020) w/ CLBP and multifactorial gait dysfunction.  I spent most of today's office visit (25 min) reviewing the patient's CT bone length study, outside nerve conduction study report, discussing etiology, pathogenesis, further diagnostic work-up and non-operative vs. operative management.  The patient's right femur measures 41.2 cm while her left femur measures 38.9 cm.  Her tibial heights are symmetric.  The outside electrodiagnostic report was of very poor quality.  I explained that I would not repeat the sacroiliac joint injections as they provided very short-term relief of symptoms.  It is my opinion that the patient's path of mechanics resulting from her leg length discrepancy status post left total hip arthroplasty are at the root cause of her chronic lumbosacral back pain.  As such, I have recommended a consultation at one of the academic orthopedic hospitals in [place] such as [facility]"" for joint diseases or \[facility]"" for special surgery for consideration of reconstruction.  I explained that patient's b/l DF weakness may be 2' undiagnosed lumbar canal stenosis even though she may have suffered left sciatic mononeuropathy s/p NIRMALA.  I would only repeat the patient's EDX testing to r/o underlying peripheral polyneuropathy as a cause of her b/l DF weakness -> gait dysfunction prior to any planned orthopedic surgery.  I cautioned the patient against chronic use of high dose PO NSAIDs and acetaminophen 2' GI/cardiac/renal toxicities.  The patient states that her primary care doctor will no longer prescribe her oxycodone.  I have agreed to provide her with a 2-week supply so she may obtain either a pain management or orthopedic surgical consultation as discussed.  Educated pt. on CNS, respiratory and GI dysfunction. I-STOP database log in not working at time of Rx.  Pt. is in agreement with plan.  All questions answered. RTC as needed.

## 2023-06-13 NOTE — DATA REVIEWED
[CT Scan] : CT Scan [FreeTextEntry1] : RIGHT LEG:\par Femur Length : 41.2 cm\par Tibia Length : 32.3 cm\par Other findings: Patient status post right total knee arthroplasty, incompletely evaluated on current study. Center of gravity passes through the midline of the knee.\par \par LEFT LEG:\par Femur Length: 38.9 cm\par Tibia Length: 32.3 cm\par Other findings: Patient status post left total hip arthroplasty, incompletely evaluated on current study. Center of gravity passes through the lateral tibial plateau.\par \par \par IMPRESSION:\par Leg length, as described above.\par

## 2023-07-13 ENCOUNTER — APPOINTMENT (OUTPATIENT)
Dept: PHYSICAL MEDICINE AND REHAB | Facility: CLINIC | Age: 53
End: 2023-07-13
Payer: MEDICARE

## 2023-07-13 VITALS
HEART RATE: 67 BPM | SYSTOLIC BLOOD PRESSURE: 126 MMHG | HEIGHT: 59 IN | BODY MASS INDEX: 31.46 KG/M2 | RESPIRATION RATE: 16 BRPM | DIASTOLIC BLOOD PRESSURE: 74 MMHG | WEIGHT: 156.04 LBS

## 2023-07-13 PROCEDURE — 99214 OFFICE O/P EST MOD 30 MIN: CPT

## 2023-07-13 NOTE — ASSESSMENT
[FreeTextEntry1] : 53 y.o. F w/ h/o congenital hip dysplasia, LLD, s/p L NIRMALA (2005) and R TKA (2020) w/ CLBP and multifactorial gait dysfunction.  I spent most of today's office visit (25 min) discussing etiology, pathogenesis, further diagnostic work-up and non-operative vs. operative management.  The patient's right femur measures 41.2 cm while her left femur measures 38.9 cm.  Her tibial heights are symmetric.  The outside electrodiagnostic report was of very poor quality.  Although I would not repeat the sacroiliac joint injections as they provided very short-term relief of symptoms, may be reasonable to send the patient for bilateral sacroiliac joint radiofrequency ablation.  I referred her to my colleague in pain management, Dr. Fay.  It is still my opinion that the patient's pathomechanics resulting from her leg length discrepancy status post left total hip arthroplasty are at the root cause of her chronic lumbosacral back pain.  I would still want her to be seen at one of the academic orthopedic hospitals in [place] such as [facility] or [facility] for consideration of reconstruction.  I explained that patient's b/l DF weakness may be 2' undiagnosed lumbar canal stenosis even though she may have suffered left sciatic mononeuropathy s/p NIRMALA.  I would only repeat the patient's EDX testing to r/o underlying peripheral polyneuropathy as a cause of her b/l DF weakness -> gait dysfunction prior to any planned orthopedic surgery.  I cautioned the patient against chronic use of high dose PO NSAIDs and acetaminophen 2' GI/cardiac/renal toxicities.  As the patient's primary care doctor will no longer prescribe her oxycodone and it is unclear the pain management doctor at [facility] will take her on as a patient, I have agreed to maintain her on the oxycodone 10 mg; 1 tab p.o. twice daily as needed immediate future.  I counseled the patient on the drugs CNS, respiratory and GI side effects.  I will prescribe 2-week courses of medication and the patient will need to be seen in the office once a month.  Patient understands that she may be subject to random urine toxicology screens.  I-STOP database checked.  Pt. is in agreement with plan.  All questions answered.  RTC 4 weeks.

## 2023-08-08 RX ORDER — ESTRADIOL 0.1 MG/G
0.1 CREAM VAGINAL
Qty: 3 | Refills: 1 | Status: ACTIVE | COMMUNITY
Start: 2022-05-24

## 2023-08-14 ENCOUNTER — APPOINTMENT (OUTPATIENT)
Dept: PHYSICAL MEDICINE AND REHAB | Facility: CLINIC | Age: 53
End: 2023-08-14
Payer: MEDICARE

## 2023-08-14 VITALS
BODY MASS INDEX: 31.85 KG/M2 | SYSTOLIC BLOOD PRESSURE: 116 MMHG | RESPIRATION RATE: 14 BRPM | WEIGHT: 158 LBS | HEART RATE: 60 BPM | DIASTOLIC BLOOD PRESSURE: 76 MMHG | HEIGHT: 59 IN

## 2023-08-14 PROCEDURE — 99214 OFFICE O/P EST MOD 30 MIN: CPT

## 2023-08-14 NOTE — ASSESSMENT
[FreeTextEntry1] : 53 y.o. F w/ h/o congenital hip dysplasia, LLD, s/p L NIRMALA (2005) and R TKA (2020) w/ CLBP and multifactorial gait dysfunction.  I spent most of today's office visit (25 min) discussing etiology, pathogenesis and further non-operative vs. operative management.  CT scanogram previously reviewed w/ the patient's right femur measuring 41.2 cm and  left femur measuring 38.9 cm.  Her tibial heights are symmetric.  Her outside electrodiagnostic report was of very poor quality.  The patient is pending consultation with my colleague in pain management, Dr. Fay, for possible sacral RFA.  It is still my opinion that the patient's pathomechanics resulting from her leg length discrepancy status post left total hip arthroplasty are at the root cause of her chronic lumbosacral back pain.  I would still want her to be seen at one of the academic orthopedic hospitals in [place] such as [facility] or [facility] for consideration of reconstruction.  I explained that patient's b/l DF weakness may be 2' undiagnosed lumbar canal stenosis even though she may have suffered left sciatic mononeuropathy s/p NIRMALA.  I would only repeat the patient's EDX testing to r/o underlying peripheral polyneuropathy as a cause of her b/l DF weakness -> gait dysfunction prior to any planned orthopedic surgery.  We again discussed the R/B/A to continued treatment with oxycodone 10 mg; 1 tab p.o. twice daily including but not limited to CNS, respiratory and GI depression.  I will prescribe 2-week courses of medication and the patient will  be seen in the office once a month.  Patient understands that she may be subject to random urine toxicology screens.  I-STOP database checked.  Pt. is in agreement with plan.  All questions answered.  RTC 4 weeks.

## 2023-08-14 NOTE — HISTORY OF PRESENT ILLNESS
[FreeTextEntry1] : 53 y.o. F w/ h/o congenital hip dysplasia, LLD, s/p L NIRMALA (2005) and R TKA (2020) w/ CLBP and multifactorial gait dysfunction returns to office for f/u.  Pt. received Rx for oxycodone 10 mg x 3 days by my colleague, Dr. Rios, while I was away from office.  Pt. states that her lumbosacral back pain is "ramping up again".  She has not seen Dr. Fay for consideration of sacral lateral branch blocks.  She is also considering consultation to correct her b/l hallux valgus deformities.   Pt. is tolerating the oxycodone well aside from some constipation which she manages with Miralax.  Denies CNS side effects.  Exercise tolerance is approximately 3 blocks w/ RW.  Pt. is not able to wear her customed molded shoes 2' to her hallux valgus deformities.

## 2023-08-14 NOTE — PHYSICAL EXAM
[FreeTextEntry1] : NAD A&Ox3 Non-obese No significant change in today's examination  Inspection ROM L-spine: 5-10' passive extension (painful) ROM Hips: right hip smooth IR/ER w/o pain; left hip restricted IR/ER Pelvic tilt: ++ Seated slump test: neg b/l SLR: neg b/l DTR's: 2+ knees; 1+ ankles (static) MMT: 4/5 B/L TA (static); 5/5 B/L GS (static) Sensation: SILT Toe & Heel Walk: Deferred Palpation: b/l SI joints VTTP (concordant) Gait: vaults over right leg w/ hyperextension left knee (static) Feet - b/l L > R hallux valgus deformities [FreeTextEntry1] : Annual Physical  [de-identified] : 53-year-old female who is here for an annual physical.  She is having a lot of anxiety.  She lost her  in February to GBM.\par She will have an intake evaluation with psychiatry on Thursday. She is on lexapro 20 mg. She states she feels lightheaded on it. still taking hydroxyzine. She c/o feeling low energy.  Has difficulty concentrating.  She has not been able to work.  She has not been able to cook\par pap not in many years\par mammo 2019\par colonoscopy never\par skin exam never\par

## 2023-08-24 ENCOUNTER — APPOINTMENT (OUTPATIENT)
Dept: OBGYN | Facility: CLINIC | Age: 53
End: 2023-08-24

## 2023-08-30 ENCOUNTER — APPOINTMENT (OUTPATIENT)
Dept: PHYSICAL MEDICINE AND REHAB | Facility: CLINIC | Age: 53
End: 2023-08-30
Payer: MEDICARE

## 2023-08-30 VITALS
OXYGEN SATURATION: 95 % | DIASTOLIC BLOOD PRESSURE: 73 MMHG | BODY MASS INDEX: 32.11 KG/M2 | SYSTOLIC BLOOD PRESSURE: 128 MMHG | HEART RATE: 98 BPM | WEIGHT: 159 LBS

## 2023-08-30 DIAGNOSIS — M25.551 PAIN IN RIGHT HIP: ICD-10-CM

## 2023-08-30 DIAGNOSIS — M25.552 PAIN IN RIGHT HIP: ICD-10-CM

## 2023-08-30 PROCEDURE — 99214 OFFICE O/P EST MOD 30 MIN: CPT

## 2023-08-31 NOTE — PHYSICAL EXAM
[FreeTextEntry1] : General exam   Constitutional: The patient appears well-developed, well-nourished, and in no apparent distress. Patient is well-groomed.    Skin: The skin is warm and dry, with normal turgor.  Eyes: PERRL.    ENMT: Ears: Hearing is grossly within normal limits.    Neck: Supple: The neck is supple.    Respiratory: Inspection: Breathing unlabored.    Neurologic: Alert and oriented x 3.   Psychiatric: Patient is cooperative and appropriate.  Mood and affect are normal.  Patient's insight is good, and memory and judgment are intact.  LUMBAR EXAM   APPEARANCE: Truncal obesity +gross deformity and malalignment - leg length discrepancy No erythema, swelling or ecchymosis Increased lumbar lordosis +muscle atrophy of the left lower extremity +muscle atrophy of the right lower extremity  TENDERNESS: Multiple trigger points over bilateral lumbar paraspinal muscles + over midline spinous processes + over left lumbar facet joints + over right lumbar facet joints + over left lumbar paraspinal muscles: erector spinae and quadratus lumborum + over right lumbar paraspinal muscles: erector spinae and quadratus lumborum + over left sacroiliac joint + over right sacroiliac joint   ROM: Pain limited AROM of the lumbar spine Pain limited PROM of the lumbar spine + pain with flexion, extension of the lumbar spine + pain with left side bending + pain with right side bending + pain with left rotation + pain with right rotation + hamstring tightness on the left + hamstring tightness on the right   SPECIAL TESTS: neg eft straight leg raising test neg right straight leg raising test FABERE left + FABERE right + FADIR left + FADIR right +   SENSORY TESTING: Intact to light touch Left L1-S2 Intact to light touch Right L1-S2   MOTOR TESTING: Muscle tone of the left lower extremity is normal Muscle tone of the right lower extremity is normal   Left hip flexion strength is 5/5 Right hip flexion strength is 5/5   Left quadriceps strength is 5/5 Right quadriceps strength is 5/5   Left ankle dorsiflexion strength is 5/5 Right ankle dorsiflexion strength is 5/5   Left ankle plantar flexion strength is 5/5 Right ankle plantar flexion strength is 5/5   REFLEXES: Patella (L4) left 0 Patella (L4) right 0   GAIT: +antalgic gait w rolator Not able to walk on toes Not able to walk on heels

## 2023-08-31 NOTE — HISTORY OF PRESENT ILLNESS
[FreeTextEntry1] : GINNA PICKARD is an 53 year old F here for initial evaluation of back pains. Ms. PICKARD was sent for consultation by Dr. Randle for possible injection.  From Dr. Randle's note: "53 y.o. F w/ h/o congenital hip dysplasia, LLD, s/p L NIRMALA (2005) and R TKA (2020) w/ CLBP and multifactorial gait dysfunction returns to office for f/u. Pt. received Rx for oxycodone 10 mg x 3 days by my colleague, Dr. Rios, while I was away from office. Pt. states that her lumbosacral back pain is "ramping up again". She has not seen Dr. Fay for consideration of sacral lateral branch blocks. She is also considering consultation to correct her b/l hallux valgus deformities. Pt. is tolerating the oxycodone well aside from some constipation which she manages with Miralax. Denies CNS side effects. Exercise tolerance is approximately 3 blocks w/ RW. Pt. is not able to wear her customed molded shoes 2' to her hallux valgus deformities....The patient is pending consultation with my colleague in pain management, Dr. Fay, for possible sacral RFA....We again discussed the R/B/A to continued treatment with oxycodone 10 mg; 1 tab p.o. twice daily including but not limited to CNS, respiratory and GI depression. I will prescribe 2-week courses of medication and the patient will be seen in the office once a month. Patient understands that she may be subject to random urine toxicology screens. I-STOP database checked."  Pain location: lower back Quality: aching, stabbing Radiation: across the lower back Severity: 10/10 Onset: many years ago Associated symptoms: muscle tightness and spasms Numbness: denies Weakness: both legs and using rolator Exacerbated by: walking, going from sitting to standing Improved by: pain medications Bowel or bladder involvement: denies  Denies bowel/bladder dysfunction, saddle anesthesia, fevers, chills, weight loss, night pain, or night sweats at this time.  The pain interferes with function, ADLs and quality of life. Patient had tried Acetaminophen, NSAIDs, prescription pain medications, muscle relaxants without any lasting relief of pain.  Patient had imaging studies to evaluate the pain.  Patient had tried physical therapy, and/or physician directed home exercises, stretching, lifestyle modification for over 8 weeks without any significant relief.

## 2023-08-31 NOTE — ASSESSMENT
[FreeTextEntry1] : Ms. GINNA PICKARD is a 53 year F with pain in the lower back across. She reports chronic long standing pain and is noting an acute on chronic exacerbation of this pain due to lumbar spondylosis without myelopathy vs hip pain vs sacroiliac joint pains. There are no myelopathic signs on today's exam.  Patient reassured and educated on the diagnosis and treatment options. Risks and benefits of treatment and of delaying treatment discussed with patient. Risks discussed include but not limited to: progression of symptoms, worsening pain and functional status, etc.  This note was generated using Dragon medical dictation software. A reasonable effort had been made for proofreading its contents, but spelling mistakes or grammatical errors may still remain. If there are any questions or points of clarification needed please notify my office.  No recent imaging in the system Sending for XR L spine Sending for XR both hips and pelvis  Patient is being sent and I am requesting authorization for MRI w/o contrast of LUMBAR SPINE to evaluate for nerve compression, stenosis, degenerative disease, soft tissue injury or other new or worsening etiology of pain. Patient had tried and failed NSAIDs and home exercise program for at least 6 weeks. During next visit MRI report and/or imaging will be reviewed with patient.  Patient offered to continue her pain management prescriptions with Dr. Randle or to see addiction psychiatry - referral placed in system I will not be taking over her prescriptions Patient expressed agreement and understanding  Lumbar RFA brochure provided Will determine further treatment plans after obtaining imaging  Follow up 4 weeks Continue follow up with Dr. Randle  Patient was advised if the following symptoms develop: chills, fever, loss of bladder control, bowel incontinence or urinary retention, numbness/tingling or weakness is present in upper or lower extremities, to go to the nearest emergency room. This may be a new clinical condition not present at the time of the patient visit that may lead to paralysis and/or death. Patient advised if the above symptoms developed to also call the office immediately to inform us and to go to the nearest emergency room.

## 2023-09-12 ENCOUNTER — APPOINTMENT (OUTPATIENT)
Dept: MRI IMAGING | Facility: CLINIC | Age: 53
End: 2023-09-12

## 2023-09-12 ENCOUNTER — APPOINTMENT (OUTPATIENT)
Dept: RADIOLOGY | Facility: CLINIC | Age: 53
End: 2023-09-12

## 2023-09-19 ENCOUNTER — APPOINTMENT (OUTPATIENT)
Dept: RADIOLOGY | Facility: CLINIC | Age: 53
End: 2023-09-19
Payer: MEDICARE

## 2023-09-19 ENCOUNTER — OUTPATIENT (OUTPATIENT)
Dept: OUTPATIENT SERVICES | Facility: HOSPITAL | Age: 53
LOS: 1 days | End: 2023-09-19
Payer: MEDICARE

## 2023-09-19 ENCOUNTER — APPOINTMENT (OUTPATIENT)
Dept: MRI IMAGING | Facility: CLINIC | Age: 53
End: 2023-09-19
Payer: MEDICARE

## 2023-09-19 ENCOUNTER — APPOINTMENT (OUTPATIENT)
Dept: PHYSICAL MEDICINE AND REHAB | Facility: CLINIC | Age: 53
End: 2023-09-19
Payer: MEDICARE

## 2023-09-19 ENCOUNTER — RESULT REVIEW (OUTPATIENT)
Age: 53
End: 2023-09-19

## 2023-09-19 VITALS
SYSTOLIC BLOOD PRESSURE: 119 MMHG | WEIGHT: 158.04 LBS | HEIGHT: 59 IN | DIASTOLIC BLOOD PRESSURE: 74 MMHG | RESPIRATION RATE: 16 BRPM | HEART RATE: 72 BPM | BODY MASS INDEX: 31.86 KG/M2

## 2023-09-19 DIAGNOSIS — Z98.890 OTHER SPECIFIED POSTPROCEDURAL STATES: Chronic | ICD-10-CM

## 2023-09-19 DIAGNOSIS — Z90.89 ACQUIRED ABSENCE OF OTHER ORGANS: Chronic | ICD-10-CM

## 2023-09-19 DIAGNOSIS — M25.551 PAIN IN RIGHT HIP: ICD-10-CM

## 2023-09-19 DIAGNOSIS — Z96.642 PRESENCE OF LEFT ARTIFICIAL HIP JOINT: Chronic | ICD-10-CM

## 2023-09-19 DIAGNOSIS — M47.817 SPONDYLOSIS WITHOUT MYELOPATHY OR RADICULOPATHY, LUMBOSACRAL REGION: ICD-10-CM

## 2023-09-19 DIAGNOSIS — Z98.84 BARIATRIC SURGERY STATUS: Chronic | ICD-10-CM

## 2023-09-19 PROCEDURE — 99214 OFFICE O/P EST MOD 30 MIN: CPT

## 2023-09-19 PROCEDURE — 72148 MRI LUMBAR SPINE W/O DYE: CPT

## 2023-09-19 PROCEDURE — 72100 X-RAY EXAM L-S SPINE 2/3 VWS: CPT | Mod: 26

## 2023-09-19 PROCEDURE — 72148 MRI LUMBAR SPINE W/O DYE: CPT | Mod: 26,MH

## 2023-09-19 PROCEDURE — 72100 X-RAY EXAM L-S SPINE 2/3 VWS: CPT

## 2023-09-19 PROCEDURE — 73521 X-RAY EXAM HIPS BI 2 VIEWS: CPT | Mod: 26

## 2023-09-19 PROCEDURE — 73521 X-RAY EXAM HIPS BI 2 VIEWS: CPT

## 2023-09-26 NOTE — OCCUPATIONAL THERAPY INITIAL EVALUATION ADULT - BED MOBILITY/TRANSFERS, PREVIOUS LEVEL OF FUNCTION, OT EVAL
Franky Lea.       Suleman Sommer RN  09/26/23 [id number] cane/independent/needs device

## 2023-09-28 ENCOUNTER — APPOINTMENT (OUTPATIENT)
Dept: PHYSICAL MEDICINE AND REHAB | Facility: CLINIC | Age: 53
End: 2023-09-28
Payer: MEDICARE

## 2023-09-28 DIAGNOSIS — M54.16 RADICULOPATHY, LUMBAR REGION: ICD-10-CM

## 2023-09-28 PROCEDURE — 99214 OFFICE O/P EST MOD 30 MIN: CPT

## 2023-10-10 ENCOUNTER — APPOINTMENT (OUTPATIENT)
Dept: PHYSICAL MEDICINE AND REHAB | Facility: CLINIC | Age: 53
End: 2023-10-10

## 2023-10-19 ENCOUNTER — APPOINTMENT (OUTPATIENT)
Dept: PHYSICAL MEDICINE AND REHAB | Facility: CLINIC | Age: 53
End: 2023-10-19
Payer: MEDICARE

## 2023-10-19 VITALS
HEART RATE: 70 BPM | BODY MASS INDEX: 32.25 KG/M2 | SYSTOLIC BLOOD PRESSURE: 155 MMHG | HEIGHT: 59 IN | WEIGHT: 160 LBS | DIASTOLIC BLOOD PRESSURE: 90 MMHG

## 2023-10-19 PROCEDURE — 95909 NRV CNDJ TST 5-6 STUDIES: CPT

## 2023-10-20 ENCOUNTER — APPOINTMENT (OUTPATIENT)
Dept: PSYCHIATRY | Facility: CLINIC | Age: 53
End: 2023-10-20
Payer: MEDICARE

## 2023-10-20 DIAGNOSIS — F41.9 ANXIETY DISORDER, UNSPECIFIED: ICD-10-CM

## 2023-10-20 DIAGNOSIS — F51.02 ADJUSTMENT INSOMNIA: ICD-10-CM

## 2023-10-20 DIAGNOSIS — F32.A ANXIETY DISORDER, UNSPECIFIED: ICD-10-CM

## 2023-10-20 PROCEDURE — 99205 OFFICE O/P NEW HI 60 MIN: CPT

## 2023-11-03 ENCOUNTER — NON-APPOINTMENT (OUTPATIENT)
Age: 53
End: 2023-11-03

## 2023-11-15 ENCOUNTER — APPOINTMENT (OUTPATIENT)
Dept: PHYSICAL MEDICINE AND REHAB | Facility: CLINIC | Age: 53
End: 2023-11-15
Payer: MEDICARE

## 2023-11-15 VITALS
HEART RATE: 69 BPM | WEIGHT: 158 LBS | RESPIRATION RATE: 16 BRPM | DIASTOLIC BLOOD PRESSURE: 95 MMHG | SYSTOLIC BLOOD PRESSURE: 153 MMHG | HEIGHT: 59 IN | BODY MASS INDEX: 31.85 KG/M2

## 2023-11-15 PROCEDURE — 76882 US LMTD JT/FCL EVL NVASC XTR: CPT

## 2023-11-15 PROCEDURE — 99214 OFFICE O/P EST MOD 30 MIN: CPT

## 2023-11-20 ENCOUNTER — APPOINTMENT (OUTPATIENT)
Dept: PHYSICAL MEDICINE AND REHAB | Facility: CLINIC | Age: 53
End: 2023-11-20

## 2023-12-03 NOTE — H&P PST ADULT - NEGATIVE GASTROINTESTINAL SYMPTOMS
Problem: Potential for Falls  Goal: Patient will remain free of falls  Description: INTERVENTIONS:  - Educate patient/family on patient safety including physical limitations  - Instruct patient to call for assistance with activity   - Consult OT/PT to assist with strengthening/mobility   - Keep Call bell within reach  - Keep bed low and locked with side rails adjusted as appropriate  - Keep care items and personal belongings within reach  - Initiate and maintain comfort rounds  - Make Fall Risk Sign visible to staff  - Offer Toileting in advance of need  - Initiate/Maintain bed alarm  - Obtain necessary fall risk management equipment  - Apply yellow socks and bracelet for high fall risk patients  - Consider moving patient to room near nurses station  Outcome: Progressing     Problem: INFECTION - ADULT  Goal: Absence or prevention of progression during hospitalization  Description: INTERVENTIONS:  - Assess and monitor for signs and symptoms of infection  - Monitor lab/diagnostic results  - Monitor all insertion sites, i.e. indwelling lines, tubes, and drains  - Monitor endotracheal if appropriate and nasal secretions for changes in amount and color  - [place] appropriate cooling/warming therapies per order  - Administer medications as ordered  - Instruct and encourage patient and family to use good hand hygiene technique  - Identify and instruct in appropriate isolation precautions for identified infection/condition  Outcome: Progressing     Problem: DISCHARGE PLANNING  Goal: Discharge to home or other facility with appropriate resources  Description: INTERVENTIONS:  - Identify barriers to discharge w/patient and caregiver  - Arrange for needed discharge resources and transportation as appropriate  - Identify discharge learning needs (meds, wound care, etc.)  - Refer to Case Management Department for coordinating discharge planning if the patient needs post-hospital services based on physician/advanced practitioner order or complex needs related to functional status, cognitive ability, or social support system  Outcome: Progressing     Problem: Knowledge Deficit  Goal: Patient/family/caregiver demonstrates understanding of disease process, treatment plan, medications, and discharge instructions  Description: Complete learning assessment and assess knowledge base.   Interventions:  - Provide teaching at level of understanding  - Provide teaching via preferred learning methods  Outcome: Progressing     Problem: RESPIRATORY - ADULT  Goal: Achieves optimal ventilation and oxygenation  Description: INTERVENTIONS:  - Assess for changes in respiratory status  - Assess for changes in mentation and behavior  - Position to facilitate oxygenation and minimize respiratory effort  - Oxygen administered by appropriate delivery if ordered  - Initiate smoking cessation education as indicated  - Encourage broncho-pulmonary hygiene including cough, deep breathe, Incentive Spirometry  - Assess the need for suctioning and aspirate as needed  - Assess and instruct to report SOB or any respiratory difficulty  - Respiratory Therapy support as indicated  Outcome: Progressing no nausea/no vomiting/no diarrhea/no constipation/no change in bowel habits/no abdominal pain

## 2023-12-04 ENCOUNTER — APPOINTMENT (OUTPATIENT)
Dept: PSYCHIATRY | Facility: CLINIC | Age: 53
End: 2023-12-04

## 2023-12-21 ENCOUNTER — APPOINTMENT (OUTPATIENT)
Dept: PHYSICAL MEDICINE AND REHAB | Facility: CLINIC | Age: 53
End: 2023-12-21
Payer: MEDICARE

## 2023-12-21 VITALS
HEART RATE: 65 BPM | WEIGHT: 154 LBS | HEIGHT: 59 IN | BODY MASS INDEX: 31.04 KG/M2 | SYSTOLIC BLOOD PRESSURE: 131 MMHG | DIASTOLIC BLOOD PRESSURE: 83 MMHG

## 2023-12-21 PROCEDURE — 99214 OFFICE O/P EST MOD 30 MIN: CPT | Mod: 25

## 2023-12-21 PROCEDURE — 20526 THER INJECTION CARP TUNNEL: CPT | Mod: RT

## 2023-12-21 PROCEDURE — 76942 ECHO GUIDE FOR BIOPSY: CPT

## 2023-12-21 NOTE — DATA REVIEWED
[CT Scan] : CT Scan [FreeTextEntry1] : 11/15/23  US EXAMINATION RIGHT MEDIAN NERVE  CSA RIGHT MEDIAN NERVE CARPAL TUNNEL INLET: 19 mm2 (enlarged) CSA RIGHT MEDIAN NERVE CARPAL TUNNEL OUTLET: 14 mm2 (relatively constricted) DIAMETER RIGHT MEDIAN NERVE LONGITUDINAL ACROSS CARPAL TUNNEL: no notch  US EXAMINATION LEFT MEDIAN NERVE  CSA LEFT MEDIAN NERVE CARPAL TUNNEL INLET: 10 mm2 (wnl) CSA LEFT MEDIAN NERVE CARPAL TUNNEL OUTLET: 10 mm2 (wnl) DIAMETER LEFT MEDIAN NERVE LONGITUDINAL ACROSS CARPAL TUNNEL: no notch  NCS B/L UE (10/19/23): Results consistent with (1) severe, predominantly demyelinating, sensorimotor, median mononeuropathy across the right wrist; (2) mild to moderate, demyelinating, sensorimotor, median mononeuropathy across the left wrist; (3) mild, demyelinating, motor, ulnar mononeuropathy across the left elbow. Full report to follow.  RIGHT LEG: Femur Length : 41.2 cm Tibia Length : 32.3 cm Other findings: Patient status post right total knee arthroplasty, incompletely evaluated on current study. Center of gravity passes through the midline of the knee.  LEFT LEG: Femur Length: 38.9 cm Tibia Length: 32.3 cm Other findings: Patient status post left total hip arthroplasty, incompletely evaluated on current study. Center of gravity passes through the lateral tibial plateau.   IMPRESSION: Leg length, as described above.

## 2023-12-21 NOTE — PHYSICAL EXAM
[FreeTextEntry1] : NAD A&Ox3 Non-obese No significant change in today's examination  Inspection ROM L-spine: 5-10' passive extension (painful) ROM Hips: right hip smooth IR/ER w/o pain; left hip restricted IR/ER Pelvic tilt: ++ Seated slump test: neg b/l SLR: neg b/l DTR's: 2+ knees; 1+ ankles (static) MMT: 4/5 B/L TA (static); 5/5 B/L GS (static) Sensation: SILT Toe & Heel Walk: Deferred Palpation: b/l SI joints VTTP (concordant) Gait: vaults over right leg w/ hyperextension left knee (static) Feet - b/l L > R hallux valgus deformities Wrists Phalen's + Tinel's + Left thenar eminence atrophy

## 2023-12-21 NOTE — HISTORY OF PRESENT ILLNESS
[FreeTextEntry1] : 53 y.o. F w/ h/o congenital hip dysplasia, LLD, s/p L NIRMALA (2005) and R TKA (2020) w/ CLBP and multifactorial gait dysfunction returns to office for ultrasound-guided median nerve Hydro dissection at the right carpal tunnel.  Results detailed below.

## 2023-12-21 NOTE — PROCEDURE
[de-identified] : Reason for procedure: CARPAL TUNNEL SYNDROME  Procedure:  1. RIGHT MEDIAN NERVE HYDRODISSECTION AT CARPAL TUNNEL 2. US GUIDANCE  Physician: KHURRAM WEN D.O. Medication injected: 8 CC D5W + 2 CC LIDOCAINE 1% (total) Sedation medications: None Estimated blood loss: None Complications: None  Technique: R/B/A to US guided median nerve hydrodissection at the carpal tunnel reviewed with patient.  Patient is agreeable and wishes to proceed.  Signed consent form to be scanned into EMR.  The patient was placed in seated position with the wrist hyperextended and supinated. Ultrasound evaluation demonstrated the a mixed hyper/hypoechoic structure with "honeycomb" appearance consistent with the median nerve within the carpal tunnel, overlying tendons and vasculature. The area was prepped in normal sterile fashion with Chloroprep x3.  A 25-G, 2.0 needle was advanced toward and just superficial to the hyper/hypoechoic structure with US guidance using a gel standoff.  After negative aspiration of heme, the above medications were injected around the nerve again with ultrasound guidance.  The needle was then directed deep to the structure.  Hydrodissection was performed with the above medications. Needle was then removed and a bandaid placed over injection site. There were no complications.   The patient was provided with post injection instructions and noted improvement in pain after injection.

## 2023-12-21 NOTE — ASSESSMENT
[FreeTextEntry1] : 53 y.o. F w/ h/o congenital hip dysplasia, LLD, s/p L NIRMALA (2005) and R TKA (2020) w/ CLBP and multifactorial gait dysfunction. I spent most of today's office visit (30 min) reviewing and performing the US guided right median nerve hydrodissection, discussing etiology, pathogenesis and further non-operative vs. operative management.  The patient tolerated today's procedure very well.  EDX findings are significant for (1) severe, predominantly demyelinating, sensorimotor, median mononeuropathy across the right wrist; (2) mild to moderate, demyelinating, sensorimotor, median mononeuropathy across the left wrist; (3) mild, demyelinating, motor, ulnar mononeuropathy across the left elbow.  Even if the patient achieves good relief with the hydrodissection, I have again recommended she consult Ortho Hand MD for consideration of formal carpal tunnel release surgery given her advanced CTS.   Regarding the patient's persistent lumbosacral spine pain, her MRI L-spine c/w Type I/II Modic changes L2-3; advanced DDD and FJ OA L4-5 & L5-S1 w/ Schmorl's node; R L4-5 and L L5-S1 NF stenosis. CT scanogram previously reviewed w/ the patient's right femur measuring 41.2 cm and left femur measuring 38.9 cm. Her tibial heights are symmetric. The patient is pending sacral RFA w/ Dr. Fay. It is still my opinion that the patient's pathomechanics resulting from her leg length discrepancy status post left total hip arthroplasty are the root cause of her chronic lumbosacral back pain. I again explained that patient's b/l (L > R) DF weakness may be 2' undiagnosed left sciatic mononeuropathy s/p NIRMALA as her new MRI did not show significant central canal stenosis and her NF stenosis is not marked.  We may consider further electrodiagnostic testing of her left leg in the future.  We again discussed the R/B/A to titration of oxycodone 10 mg; 1 tab p.o. three times daily including but not limited to CNS, respiratory and GI depression. I will continue to prescribe 2-week courses of medication as the patient's addiction psychiatrist does not prescribe narcotic analgesics.  I refilled her Rx on 12/17/23.  The patient understands that she still may be subject to random urine toxicology screens.  Of note, the patient is now maintained on low-dose duloxetine 20 mg at the hour of sleep and low-dose trazodone which she does not find helpful.  Pt. is in agreement with plan. All questions answered. RTC 4 weeks.

## 2024-01-17 ENCOUNTER — APPOINTMENT (OUTPATIENT)
Dept: PHYSICAL MEDICINE AND REHAB | Facility: CLINIC | Age: 54
End: 2024-01-17

## 2024-01-17 NOTE — HISTORY OF PRESENT ILLNESS
[FreeTextEntry1] : 53 y.o. F w/ h/o congenital hip dysplasia, LLD, s/p L NIRMALA (2005) and R TKA (2020) w/ CLBP and multifactorial gait dysfunction returns to office for follow up s/p US guided right median nerve hydrodissection at level of carpal tunnel (12/21/23).

## 2024-01-25 ENCOUNTER — APPOINTMENT (OUTPATIENT)
Dept: PHYSICAL MEDICINE AND REHAB | Facility: CLINIC | Age: 54
End: 2024-01-25
Payer: MEDICARE

## 2024-01-25 VITALS
SYSTOLIC BLOOD PRESSURE: 146 MMHG | HEIGHT: 59 IN | WEIGHT: 151 LBS | DIASTOLIC BLOOD PRESSURE: 80 MMHG | BODY MASS INDEX: 30.44 KG/M2 | HEART RATE: 69 BPM

## 2024-01-25 PROCEDURE — 99214 OFFICE O/P EST MOD 30 MIN: CPT

## 2024-02-23 ENCOUNTER — NON-APPOINTMENT (OUTPATIENT)
Age: 54
End: 2024-02-23

## 2024-02-29 ENCOUNTER — APPOINTMENT (OUTPATIENT)
Dept: PHYSICAL MEDICINE AND REHAB | Facility: CLINIC | Age: 54
End: 2024-02-29

## 2024-02-29 NOTE — ASSESSMENT
[FreeTextEntry1] : 54 y.o. F w/ h/o congenital hip dysplasia, LLD, s/p L NIRMALA (2005) and R TKA (2020) w/ CLBP and multifactorial gait dysfunction. I spent most of today's office visit (30 min) discussing etiology, pathogenesis and further non-operative vs. operative management.  Unfortunately, the patient did not have much relief from the ultrasound-guided right median nerve hydrodissection.  EDX findings are significant for (1) severe, predominantly demyelinating, sensorimotor, median mononeuropathy across the right wrist; (2) mild to moderate, demyelinating, sensorimotor, median mononeuropathy across the left wrist; (3) mild, demyelinating, motor, ulnar mononeuropathy across the left elbow.  I have again recommended she consult Ortho Hand MD for consideration of formal carpal tunnel release surgery given her advanced CTS.   Regarding the patient's persistent lumbosacral spine pain, her MRI L-spine c/w Type I/II Modic changes L2-3; advanced DDD and FJ OA L4-5 & L5-S1 w/ Schmorl's node; R L4-5 and L L5-S1 NF stenosis. CT scanogram previously reviewed w/ the patient's right femur measuring 41.2 cm and left femur measuring 38.9 cm. Her tibial heights are symmetric. The patient is pending sacral RFA w/ Dr. Fay.  It is still my opinion that the patient's pathomechanics resulting from her leg length discrepancy status post left total hip arthroplasty are the root cause of her chronic lumbosacral back pain. I again explained that patient's b/l (L > R) DF weakness may be 2' undiagnosed left sciatic mononeuropathy s/p NIRMALA as her new MRI did not show significant central canal stenosis and her NF stenosis is not marked.  We may consider further electrodiagnostic testing of her left leg in the future.  I provided the patient with a new prescription for physical therapy to work on her gait, balance and lower extremity strengthening.  We again discussed the R/B/A to titration of oxycodone 10 mg; 1 tab p.o. three times daily including but not limited to CNS, respiratory and GI depression. I will continue to prescribe 2-week courses of medication as the patient's addiction psychiatrist does not prescribe narcotic analgesics.  I-STOP database checked.  Reference # [id number].  The patient understands that she still may be subject to random urine toxicology screens.  Of note, the patient is maintained on low-dose duloxetine 20 mg at the hour of sleep and low-dose trazodone which she does not find helpful.  Pt. is in agreement with plan. All questions answered. RTC 4 weeks.

## 2024-02-29 NOTE — PHYSICAL EXAM
[FreeTextEntry1] : NAD A&Ox3 Non-obese No significant change in today's examination  Inspection ROM L-spine: 5-10' passive extension (painful) ROM Hips: right hip smooth IR/ER w/o pain; left hip restricted IR/ER Pelvic tilt: ++ Seated slump test: neg b/l SLR: neg b/l DTR's: 2+ knees; 1+ ankles (static) MMT: 4/5 B/L TA (static); 5/5 B/L GS (static) Sensation: SILT Toe & Heel Walk: Deferred Palpation: b/l SI joints VTTP (concordant) Gait: vaults over right leg w/ hyperextension left knee (static) Feet - b/l L > R hallux valgus deformities Wrists Phalen's + Tinel's + Left thenar eminence atrophy Hypertension

## 2024-02-29 NOTE — HISTORY OF PRESENT ILLNESS
[FreeTextEntry1] : 54 y.o. F w/ h/o congenital hip dysplasia, LLD, s/p L NIRMALA (2005) and R TKA (2020) w/ CLBP and multifactorial gait dysfunction returns to office for f/u.

## 2024-02-29 NOTE — ASSESSMENT
FUTURE VISIT INFORMATION      FUTURE VISIT INFORMATION:    Date: 3/1/22    Time: 9:00am    Location: [facility]  REFERRAL INFORMATION:    Referring provider:  Ursula    Referring providers clinic:  [facility]    Reason for visit/diagnosis  Cataract eval    RECORDS REQUESTED FROM:         Clinic name Comments Records Status Imaging Status   [facility] Ov/referral 8/25/20  OV/notes 2/11/14-8/25/20 EPIC                   [FreeTextEntry1] : 54 y.o. F w/ h/o congenital hip dysplasia, LLD, s/p L NIRMALA (2005) and R TKA (2020) w/ CLBP and multifactorial gait dysfunction. I spent most of today's office visit (30 min) discussing etiology, pathogenesis and further non-operative vs. operative management.  Unfortunately, the patient did not have much relief from the ultrasound-guided right median nerve hydrodissection.  EDX findings are significant for (1) severe, predominantly demyelinating, sensorimotor, median mononeuropathy across the right wrist; (2) mild to moderate, demyelinating, sensorimotor, median mononeuropathy across the left wrist; (3) mild, demyelinating, motor, ulnar mononeuropathy across the left elbow.  I have again recommended she consult Ortho Hand MD for consideration of formal carpal tunnel release surgery given her advanced CTS.   Regarding the patient's persistent lumbosacral spine pain, her MRI L-spine c/w Type I/II Modic changes L2-3; advanced DDD and FJ OA L4-5 & L5-S1 w/ Schmorl's node; R L4-5 and L L5-S1 NF stenosis. CT scanogram previously reviewed w/ the patient's right femur measuring 41.2 cm and left femur measuring 38.9 cm. Her tibial heights are symmetric. The patient is pending sacral RFA w/ Dr. Fay.  It is still my opinion that the patient's pathomechanics resulting from her leg length discrepancy status post left total hip arthroplasty are the root cause of her chronic lumbosacral back pain. I again explained that patient's b/l (L > R) DF weakness may be 2' undiagnosed left sciatic mononeuropathy s/p NIRMALA as her new MRI did not show significant central canal stenosis and her NF stenosis is not marked.  We may consider further electrodiagnostic testing of her left leg in the future.  I provided the patient with a new prescription for physical therapy to work on her gait, balance and lower extremity strengthening.  We again discussed the R/B/A to titration of oxycodone 10 mg; 1 tab p.o. three times daily including but not limited to CNS, respiratory and GI depression. I will continue to prescribe 2-week courses of medication as the patient's addiction psychiatrist does not prescribe narcotic analgesics.  I-STOP database checked.  Reference # [id number].  The patient understands that she still may be subject to random urine toxicology screens.  Of note, the patient is maintained on low-dose duloxetine 20 mg at the hour of sleep and low-dose trazodone which she does not find helpful.  Pt. is in agreement with plan. All questions answered. RTC 4 weeks.

## 2024-03-07 ENCOUNTER — APPOINTMENT (OUTPATIENT)
Dept: PHYSICAL MEDICINE AND REHAB | Facility: CLINIC | Age: 54
End: 2024-03-07

## 2024-03-07 ENCOUNTER — NON-APPOINTMENT (OUTPATIENT)
Age: 54
End: 2024-03-07

## 2024-03-10 NOTE — HISTORY OF PRESENT ILLNESS
[FreeTextEntry1] : GINNA PICKARD is here for follow up. Since last visit   Denies any new pain, numbness or weakness, bowel/bladder dysfunction, saddle anesthesia, fevers, chills, weight loss, night pain, or night sweats at this time.

## 2024-03-11 ENCOUNTER — APPOINTMENT (OUTPATIENT)
Dept: PHYSICAL MEDICINE AND REHAB | Facility: CLINIC | Age: 54
End: 2024-03-11

## 2024-03-14 ENCOUNTER — APPOINTMENT (OUTPATIENT)
Dept: PHYSICAL MEDICINE AND REHAB | Facility: CLINIC | Age: 54
End: 2024-03-14

## 2024-03-14 NOTE — HISTORY OF PRESENT ILLNESS
[FreeTextEntry1] : 53 y.o. F w/ h/o congenital hip dysplasia, LLD, s/p L NIRMALA (2005) and R TKA (2020) w/ CLBP and multifactorial gait dysfunction returns to office for follow up.

## 2024-03-14 NOTE — ASSESSMENT
[FreeTextEntry1] : 53 y.o. F w/ h/o congenital hip dysplasia, LLD, s/p L NIRMALA (2005) and R TKA (2020) w/ CLBP and multifactorial gait dysfunction. I spent most of today's office visit (30 min) discussing etiology, pathogenesis and further non-operative vs. operative management.  Unfortunately, the patient did not have much relief from the ultrasound-guided right median nerve hydrodissection.  EDX findings are significant for (1) severe, predominantly demyelinating, sensorimotor, median mononeuropathy across the right wrist; (2) mild to moderate, demyelinating, sensorimotor, median mononeuropathy across the left wrist; (3) mild, demyelinating, motor, ulnar mononeuropathy across the left elbow.  I have again recommended she consult Ortho Hand MD for consideration of formal carpal tunnel release surgery given her advanced CTS.   Regarding the patient's persistent lumbosacral spine pain, her MRI L-spine c/w Type I/II Modic changes L2-3; advanced DDD and FJ OA L4-5 & L5-S1 w/ Schmorl's node; R L4-5 and L L5-S1 NF stenosis. CT scanogram previously reviewed w/ the patient's right femur measuring 41.2 cm and left femur measuring 38.9 cm. Her tibial heights are symmetric. The patient is pending sacral RFA w/ Dr. Fay.  It is still my opinion that the patient's pathomechanics resulting from her leg length discrepancy status post left total hip arthroplasty are the root cause of her chronic lumbosacral back pain. I again explained that patient's b/l (L > R) DF weakness may be 2' undiagnosed left sciatic mononeuropathy s/p NIRMALA as her new MRI did not show significant central canal stenosis and her NF stenosis is not marked.  We may consider further electrodiagnostic testing of her left leg in the future.  I provided the patient with a new prescription for physical therapy to work on her gait, balance and lower extremity strengthening.  We again discussed the R/B/A to titration of oxycodone 10 mg; 1 tab p.o. three times daily including but not limited to CNS, respiratory and GI depression. I will continue to prescribe 2-week courses of medication as the patient's addiction psychiatrist does not prescribe narcotic analgesics.  I-STOP database checked.  Reference # [id number].  The patient understands that she still may be subject to random urine toxicology screens.  Of note, the patient is maintained on low-dose duloxetine 20 mg at the hour of sleep and low-dose trazodone which she does not find helpful.  Pt. is in agreement with plan. All questions answered. RTC 4 weeks.

## 2024-03-20 ENCOUNTER — APPOINTMENT (OUTPATIENT)
Dept: PHYSICAL MEDICINE AND REHAB | Facility: CLINIC | Age: 54
End: 2024-03-20
Payer: MEDICARE

## 2024-03-20 VITALS
WEIGHT: 149.13 LBS | HEIGHT: 59 IN | SYSTOLIC BLOOD PRESSURE: 140 MMHG | HEART RATE: 69 BPM | DIASTOLIC BLOOD PRESSURE: 41 MMHG | BODY MASS INDEX: 30.06 KG/M2 | RESPIRATION RATE: 14 BRPM

## 2024-03-20 PROCEDURE — 99214 OFFICE O/P EST MOD 30 MIN: CPT

## 2024-03-20 RX ORDER — TRAZODONE HYDROCHLORIDE 50 MG/1
50 TABLET ORAL
Qty: 30 | Refills: 0 | Status: DISCONTINUED | COMMUNITY
Start: 2023-10-20 | End: 2024-03-20

## 2024-03-20 RX ORDER — DULOXETINE HYDROCHLORIDE 20 MG/1
20 CAPSULE, DELAYED RELEASE ORAL
Refills: 0 | Status: DISCONTINUED | COMMUNITY

## 2024-03-20 RX ORDER — TRAMADOL HYDROCHLORIDE 50 MG/1
50 TABLET, COATED ORAL
Qty: 30 | Refills: 0 | Status: DISCONTINUED | COMMUNITY
Start: 2022-10-27 | End: 2024-03-20

## 2024-03-20 RX ORDER — DULOXETINE HYDROCHLORIDE 20 MG/1
20 CAPSULE, DELAYED RELEASE PELLETS ORAL
Qty: 30 | Refills: 1 | Status: DISCONTINUED | COMMUNITY
Start: 2023-10-20 | End: 2024-03-20

## 2024-03-20 NOTE — HISTORY OF PRESENT ILLNESS
[FreeTextEntry1] : 53 y.o. F w/ h/o congenital hip dysplasia, LLD, s/p L NIRMALA (2005) and R TKA (2020) w/ CLBP and multifactorial gait dysfunction returns to office for f/u.  Pt. has yet to see Ortho Hand MD for CT release surgery 2' ongoing insurance issues.  Still has N/T in hands.  Cannot tolerate static wrist splints.  Pending re-consult with Dr. Fay for lateral branch blocks to b/l SI joints.  Still has chronic LS back pain which makes getting out of bed difficult.  Pt. did consult Addiction Psych MD who recommended low dose duloxetine 20 mg and trazodone 50 mg for sleep but patient did not notice much difference in her pain/depression/sleep.  Still takes Oxycodone 10 mg tid which she tolerates well aside from constipation.  She is on bowel management program inclusive of Dulcolax and fiber.

## 2024-03-20 NOTE — ASSESSMENT
[FreeTextEntry1] : 53 y.o. F w/ h/o congenital hip dysplasia, LLD, s/p L NIRMALA (2005) and R TKA (2020) w/ CLBP, multifactorial gait dysfunction and b/l CTS. I spent most of today's office visit (30 min) discussing etiology, pathogenesis and further non-operative vs. operative management.  EDX findings (Oct 2023) significant for (1) severe, predominantly demyelinating, sensorimotor, median mononeuropathy across the right wrist; (2) mild to moderate, demyelinating, sensorimotor, median mononeuropathy across the left wrist; (3) mild, demyelinating, motor, ulnar mononeuropathy across the left elbow.  I encouraged the patient to reconsult Dr. Slime Sen for consideration of bilateral carpal tunnel release surgery as there are no good nonoperative measures for advanced CTS.  Regarding the patient's persistent lumbosacral spine pain, her MRI L-spine c/w Type I/II Modic changes L2-3; advanced DDD and FJ OA L4-5 & L5-S1 w/ Schmorl's node; R L4-5 and L L5-S1 NF stenosis. CT scanogram previously reviewed w/ the patient's right femur measuring 41.2 cm and left femur measuring 38.9 cm. Her tibial heights are symmetric. The patient is still pending sacral RFA w/ Dr. Fay.  I have encouraged her to follow-up with him as I do believe she may benefit from the procedure as she had positive local anesthetic blocks to the SI joints in the past.  It is still my opinion that the patient's pathomechanics resulting from her leg length discrepancy status post left total hip arthroplasty are the root cause of her chronic lumbosacral back pain.  I again explained that patient's b/l (L > R) DF weakness may be 2' undiagnosed left sciatic mononeuropathy s/p NIRMALA as her new MRI did not show significant central canal stenosis and her NF stenosis is not marked.  We may consider further electrodiagnostic testing of her left leg in the future.    We again discussed the R/B/A to continue treatment with oxycodone 10 mg; 1 tab p.o. three times daily including but not limited to CNS, respiratory and GI depression. I will continue to prescribe 2-week courses of medication as the patient's addiction psychiatrist does not prescribe narcotic analgesics.  I-STOP database checked.  Reference # [id number].  She has a naloxone kit at home.  The patient understands that she still may be subject to random urine toxicology screens.  Pt. is in agreement with plan.  All questions answered.  RTC 4 weeks.

## 2024-03-26 DIAGNOSIS — M25.531 PAIN IN RIGHT WRIST: ICD-10-CM

## 2024-03-27 ENCOUNTER — APPOINTMENT (OUTPATIENT)
Dept: ORTHOPEDIC SURGERY | Facility: CLINIC | Age: 54
End: 2024-03-27
Payer: MEDICARE

## 2024-03-27 VITALS
HEIGHT: 59 IN | BODY MASS INDEX: 30.24 KG/M2 | DIASTOLIC BLOOD PRESSURE: 76 MMHG | WEIGHT: 150 LBS | TEMPERATURE: 98.1 F | SYSTOLIC BLOOD PRESSURE: 130 MMHG | HEART RATE: 76 BPM

## 2024-03-27 DIAGNOSIS — G56.00 CARPAL TUNNEL SYNDROME, UNSPECIFIED UPPER LIMB: ICD-10-CM

## 2024-03-27 DIAGNOSIS — M25.531 PAIN IN RIGHT WRIST: ICD-10-CM

## 2024-03-27 DIAGNOSIS — M25.532 PAIN IN RIGHT WRIST: ICD-10-CM

## 2024-03-27 DIAGNOSIS — M79.642 PAIN IN RIGHT HAND: ICD-10-CM

## 2024-03-27 DIAGNOSIS — M79.641 PAIN IN RIGHT HAND: ICD-10-CM

## 2024-03-27 PROCEDURE — 99215 OFFICE O/P EST HI 40 MIN: CPT | Mod: 25

## 2024-03-27 PROCEDURE — 20526 THER INJECTION CARP TUNNEL: CPT | Mod: 50

## 2024-03-27 PROCEDURE — 73130 X-RAY EXAM OF HAND: CPT | Mod: 50

## 2024-03-27 NOTE — HISTORY OF PRESENT ILLNESS
[FreeTextEntry1] : Zaria is a 54-year-old female presents today with chronic exacerbated bilateral hand and wrist discomfort as well as numbness and tingling.  She describes both daytime and nighttime symptoms.  The symptoms are affecting her ADLs.  She reports that she had an EMG study done in the past.

## 2024-03-27 NOTE — PHYSICAL EXAM
[de-identified] : [4] views of [bilateral hands and wrists] were obtained today in my office and were seen by me and discussed with the patient.  These [show findings consistent with minimal bilateral basal joint OA and findings of IP joint OA]  Carpal tunnel EMG study reviewed and discussed with patient. [de-identified] : Examination of the [bilateral] wrist reveals discomfort with compression at the level of the volar carpal tunnel eliciting numbness/tingling throughout the fingertips.

## 2024-03-27 NOTE — ASSESSMENT
[FreeTextEntry1] : ASSESSMENT: The patient comes in today with chronic exacerbated bilateral hand and wrist discomfort as well as numbness and tingling.  She describes both daytime and nighttime symptoms.  The symptoms are affecting her ADLs.  Carpal tunnel EMG study reviewed and discussed with the patient.  Her symptoms are consistent with bilateral carpal tunnel syndrome.  Treatment modalities were discussed, the patient elects for injections.   The patient was adequately and thoroughly informed of my assessment of their current condition(s).  - This may diminish bodily function for the extremity.  We discussed prognosis, treatment modalities including operative and nonoperative options for the above diagnostic assessment. As always, 2nd opinion is always provided as an option. For this, when accessible, I was able to review other physicians note(s) including reviewing other tests, imaging results as well as personally view these results for my own interpretation.      Injection:   The risks and benefits of a steroid injection were discussed in detail. The risks include but are not limited to: pain, infection, swelling, flare response, bleeding, subcutaneous fat atrophy, skin depigmentation and/or elevation of blood sugar. The risk of incomplete resolution of symptoms, recurrence and additional intervention was reviewed and considered by the patient.  The patient agreed to proceed and under a sterile prep, I injected 1 unit (6mg) into 1 cc of a combination of Celestone and Lidocaine into the [bilateral carpal tunnel]. The patient tolerated the injection well.   The patient was adequately and thoroughly informed of my assessment of their current condition(s).     DISCUSSION: 1.  Injections as above.  Activity modifications.  Bracing as needed. 2. [x] 3. [x]

## 2024-04-01 ENCOUNTER — APPOINTMENT (OUTPATIENT)
Dept: PHYSICAL MEDICINE AND REHAB | Facility: CLINIC | Age: 54
End: 2024-04-01

## 2024-04-05 ENCOUNTER — NON-APPOINTMENT (OUTPATIENT)
Age: 54
End: 2024-04-05

## 2024-04-08 ENCOUNTER — APPOINTMENT (OUTPATIENT)
Dept: PHYSICAL MEDICINE AND REHAB | Facility: CLINIC | Age: 54
End: 2024-04-08

## 2024-04-16 ENCOUNTER — APPOINTMENT (OUTPATIENT)
Dept: PHYSICAL MEDICINE AND REHAB | Facility: CLINIC | Age: 54
End: 2024-04-16
Payer: MEDICARE

## 2024-04-16 PROCEDURE — 99213 OFFICE O/P EST LOW 20 MIN: CPT

## 2024-04-16 NOTE — ASSESSMENT
[FreeTextEntry1] : Ms. GINNA PICKARD is a 54 year F with pain in the lower back across and down the left leg. She reports chronic long standing pain and is noting an acute on chronic exacerbation of this pain due to lumbar spondylosis without myelopathy vs hip pain vs sacroiliac joint pains and also radiculopathy. There are no myelopathic signs on today's exam. Since last time, GINNA PICKARD reports that her major pain is across the lower back.  Patient reassured and educated on the diagnosis and treatment options. Risks and benefits of treatment and of delaying treatment discussed with patient. Risks discussed include but not limited to: progression of symptoms, worsening pain and functional status, etc.  This note was generated using Dragon medical dictation software. A reasonable effort had been made for proofreading its contents, but spelling mistakes or grammatical errors may still remain. If there are any questions or points of clarification needed please notify my office.  Continue follow up with Dr. Randle Referring to Dr. Mccord for leg length discrepancy  Patient had tried and failed conservative treatment. This includes but is not limited to: Acetaminophen, NSAIDs, muscle relaxants, neuropathic medications, physician directed home exercises and/or physical therapy for at least 8 weeks. After a thorough discussion of risks and benefits patient would like to proceed with BILATERAL SACROILIAC JOINT STEROID INJECTION WITH FLUOROSCOPIC GUIDANCE  Risks and benefits of having injection discussed with patient. Risks discussed included, but not limited to: pain, infection, bleeding requiring emergency surgery, headaches, damage to vital organs, etc.  At this time, patient appears to be psychologically stable. Based on the history, physical exam and diagnostic findings, patient will benefit from this procedure. The goal of this procedure is to reduce pain and inflammation, improve function and range of motion and to further promote increased activity and return to previous level of functioning. The ultimate goal of performing this procedure is to improve patient's quality of life.  Asking for authorization for BILATERAL SACROILIAC JOINT STEROID INJECTION WITH FLUOROSCOPIC GUIDANCE to help treat patients pain.  Patient will be scheduled for this procedure.  If patient get's relief will plan for SIJ RFA  Patient was advised if the following symptoms develop: chills, fever, loss of bladder control, bowel incontinence or urinary retention, numbness/tingling or weakness is present in upper or lower extremities, to go to the nearest emergency room. This may be a new clinical condition not present at the time of the patient visit that may lead to paralysis and/or death. Patient advised if the above symptoms developed to also call the office immediately to inform us and to go to the nearest emergency room.

## 2024-04-16 NOTE — PHYSICAL EXAM
[FreeTextEntry1] : General exam   Constitutional: The patient appears well-developed, well-nourished, and in no apparent distress. Patient is well-groomed.    Skin: The skin is warm and dry, with normal turgor.  Eyes: PERRL.    ENMT: Ears: Hearing is grossly within normal limits.    Neck: Supple: The neck is supple.    Respiratory: Inspection: Breathing unlabored.    Neurologic: Alert and oriented x 3.   Psychiatric: Patient is cooperative and appropriate.  Mood and affect are normal.  Patient's insight is good, and memory and judgment are intact.  Lumbar Skin c/d/i without any erythema, swelling, effusion  Diffuse tenderness Multiple trigger points Bilateral SIJ tenderness FABERE/FADIR + LEFT > RIGHT Antalgic, asymmetrical gait w SC Leg length discrepancy

## 2024-04-16 NOTE — HISTORY OF PRESENT ILLNESS
[FreeTextEntry1] : GINNA PICKARD is here for follow up. Since last visit she had seen Dr. Alarcon but continue chronic pain management with Dr. Randle. We were planning to do lumbar MOODY, but patient was hesitant. Her predominant pain today with back pain across the lower back. Pain is over 6/10 on NRS and interferes with walking.   Denies any new pain, numbness or weakness, bowel/bladder dysfunction, saddle anesthesia, fevers, chills, weight loss, night pain, or night sweats at this time.

## 2024-04-18 ENCOUNTER — APPOINTMENT (OUTPATIENT)
Dept: PHYSICAL MEDICINE AND REHAB | Facility: CLINIC | Age: 54
End: 2024-04-18
Payer: MEDICARE

## 2024-04-18 VITALS
WEIGHT: 147 LBS | RESPIRATION RATE: 14 BRPM | SYSTOLIC BLOOD PRESSURE: 130 MMHG | HEIGHT: 59 IN | BODY MASS INDEX: 29.64 KG/M2 | HEART RATE: 65 BPM | DIASTOLIC BLOOD PRESSURE: 76 MMHG

## 2024-04-18 DIAGNOSIS — Z96.651 PRESENCE OF RIGHT ARTIFICIAL KNEE JOINT: ICD-10-CM

## 2024-04-18 DIAGNOSIS — M47.817 SPONDYLOSIS W/OUT MYELOPATHY OR RADICULOPATHY, LUMBOSACRAL REGION: ICD-10-CM

## 2024-04-18 PROCEDURE — 99214 OFFICE O/P EST MOD 30 MIN: CPT

## 2024-04-18 NOTE — ASSESSMENT
[FreeTextEntry1] : 54 y.o. F w/ h/o congenital hip dysplasia, LLD, s/p L NIRMALA (2005) and R TKA (2020) w/ CLBP, multifactorial gait dysfunction and b/l CTS. I spent most of today's office visit (30 min) discussing etiology, pathogenesis and further non-operative vs. operative management.  EDX findings (Oct 2023) significant for (1) severe, predominantly demyelinating, sensorimotor, median mononeuropathy across the right wrist; (2) mild to moderate, demyelinating, sensorimotor, median mononeuropathy across the left wrist; (3) mild, demyelinating, motor, ulnar mononeuropathy across the left elbow.  I again encouraged the patient to reconsult Dr. Slime Sen for consideration of bilateral carpal tunnel release surgery as there are no good nonoperative measures for advanced CTS and repeat corticosteroid injections are not a good option moving forward..  Regarding the patient's persistent lumbosacral spine pain, her MRI L-spine (Sept. 2023) c/w Type I/II Modic changes L2-3; advanced DDD and FJ OA L4-5 & L5-S1 w/ Schmorl's node; R L4-5 and L L5-S1 NF stenosis; however, these changes do not necessarily correlate with the patient's axial spine pain. CT scanogram previously reviewed w/ the patient's right femur measuring 41.2 cm and left femur measuring 38.9 cm. Her tibial heights are symmetric. The patient is pending repeat bilateral SI joint injections w/ Dr. Fay next week.  I am hoping that she will then undergo the radiofrequency ablation procedure.  It is still my opinion that the patient's pathomechanics resulting from her leg length discrepancy status post left total hip arthroplasty are the root cause of her chronic lumbosacral back pain.  She will consult my colleague, Dr. Sean Sethi to see if he would be able to build up her foot where to level her out.  I again explained that patient's b/l (L > R) DF weakness may be 2' undiagnosed left sciatic mononeuropathy s/p NIRMALA as her new MRI did not show significant central canal stenosis and her NF stenosis is not marked.  We may consider further electrodiagnostic testing of her left leg in the future.    We again discussed the R/B/A to continue treatment with oxycodone 10 mg; 1 tab p.o. three times daily including but not limited to CNS, respiratory and GI depression. I will continue to prescribe 2-week courses of medication as the patient's addiction psychiatrist does not prescribe narcotic analgesics.  I-STOP database checked.  Reference # .  She has a naloxone kit at home.  The patient understands that she still may be subject to random urine toxicology screens.  Pt. is in agreement with plan.  All questions answered.  RTC 4 weeks.  This patient is being managed for a complex chronic pain condition that requires ongoing medical management. The nature of this condition requires a longitudinal relationship and monitoring over time for appropriate treatment.

## 2024-04-18 NOTE — HISTORY OF PRESENT ILLNESS
[FreeTextEntry1] : 54 y.o. F w/ h/o congenital hip dysplasia, LLD, s/p L NIRMALA (2005) and R TKA (2020) w/ CLBP, multifactorial gait dysfunction and b/l CTS returns to office for f/u.  Pt. re-consulted Dr. aFy who plans to repeat b/l SI joint CSI under fluoro (tentatively scheduled for next Friday) and if patient gets good relief will then submit for SI joint RFA.  Pt. states that her lumbosacral back pain "is as bad as it ever gets".  Oxycodone 10 mg does help for approximately 2 hours then wears off.  She tolerates the medication well w/o significant CNS side effects.  Has some constipation which she manages w/ OTC Metamucil.  Pt. saw Dr. Garcia who administered CSI and did not offer surgical decompression.  Still need aide of SC to ambulate outside.

## 2024-04-18 NOTE — DATA REVIEWED
[CT Scan] : CT Scan [FreeTextEntry1] : 11/15/23  US EXAMINATION RIGHT MEDIAN NERVE  CSA RIGHT MEDIAN NERVE CARPAL TUNNEL INLET: 19 mm2 (enlarged) CSA RIGHT MEDIAN NERVE CARPAL TUNNEL OUTLET: 14 mm2 (relatively constricted) DIAMETER RIGHT MEDIAN NERVE LONGITUDINAL ACROSS CARPAL TUNNEL: no notch  US EXAMINATION LEFT MEDIAN NERVE  CSA LEFT MEDIAN NERVE CARPAL TUNNEL INLET: 10 mm2 (wnl) CSA LEFT MEDIAN NERVE CARPAL TUNNEL OUTLET: 10 mm2 (wnl) DIAMETER LEFT MEDIAN NERVE LONGITUDINAL ACROSS CARPAL TUNNEL: no notch  NCS B/L UE (10/19/23): Results consistent with (1) severe, predominantly demyelinating, sensorimotor, median mononeuropathy across the right wrist; (2) mild to moderate, demyelinating, sensorimotor, median mononeuropathy across the left wrist; (3) mild, demyelinating, motor, ulnar mononeuropathy across the left elbow. Full report to follow.  RIGHT LEG: Femur Length : 41.2 cm Tibia Length : 32.3 cm Other findings: Patient status post right total knee arthroplasty, incompletely evaluated on current study. Center of gravity passes through the midline of the knee.  LEFT LEG: Femur Length: 38.9 cm Tibia Length: 32.3 cm Other findings: Patient status post left total hip arthroplasty, incompletely evaluated on current study. Center of gravity passes through the lateral tibial plateau.   IMPRESSION: Leg length, as described above.  MRI L-spine (Sept. 2023) Dextroscoliosis with multilevel spondylosis, as above. Degenerative grade 1 anterolisthesis at L4-L5 and L5-S1. Moderate spinal canal narrowing at L4-L5. Multilevel foraminal narrowing, most pronounced on the left at L1-L2 and L4-L5.

## 2024-04-26 ENCOUNTER — APPOINTMENT (OUTPATIENT)
Dept: PHYSICAL MEDICINE AND REHAB | Facility: CLINIC | Age: 54
End: 2024-04-26
Payer: MEDICARE

## 2024-04-26 ENCOUNTER — OUTPATIENT (OUTPATIENT)
Dept: OUTPATIENT SERVICES | Facility: HOSPITAL | Age: 54
LOS: 1 days | End: 2024-04-26
Payer: MEDICARE

## 2024-04-26 DIAGNOSIS — Z98.890 OTHER SPECIFIED POSTPROCEDURAL STATES: Chronic | ICD-10-CM

## 2024-04-26 DIAGNOSIS — Z96.642 PRESENCE OF LEFT ARTIFICIAL HIP JOINT: Chronic | ICD-10-CM

## 2024-04-26 DIAGNOSIS — Z90.89 ACQUIRED ABSENCE OF OTHER ORGANS: Chronic | ICD-10-CM

## 2024-04-26 DIAGNOSIS — M53.3 SACROCOCCYGEAL DISORDERS, NOT ELSEWHERE CLASSIFIED: ICD-10-CM

## 2024-04-26 DIAGNOSIS — Z98.84 BARIATRIC SURGERY STATUS: Chronic | ICD-10-CM

## 2024-04-26 PROCEDURE — G0260: CPT

## 2024-04-26 PROCEDURE — 27096 INJECT SACROILIAC JOINT: CPT | Mod: 50

## 2024-04-26 NOTE — PROCEDURE
[de-identified] : [facility] [address], [place], [address] - [phone]   PATIENT: GINNA PICKARD MEDICAL RECORD: [id number] DATE OF PROCEDURE: 04/26/2024   PHYSICIAN: Mathew Fay DO  BILATERAL SACROILIAC JOINT INTRAARTICULAR STEROID INJECTION WITH FLUOROSCOPIC GUIDANCE  Injectate: 80mg/mL methylPrednisolone and 0.25% Bupivacaine 4mL  Complications: None  Estimated Blood Loss: None  SACROILIAC JOINT INTRAARTICULAR STEROID INJECTION WITH FLUOROSCOPIC GUIDANCE  Technique: After informed consent was obtained, patient was taken to the operating room and placed on the table in the prone position. All pressure points were supported. The lumbosacral area was then exposed and prepped with betadine x3 followed by chlorhexidine and draped in a standard sterile manner. The entirety of the procedure was done under sterile technique using sterile gloves, surgical mask and cap and all medication expiration dates were verified prior to being drawn into syringes.  Utilizing AP fluoroscopy with a slight cephalad tilt and contralateral oblique projection. The right sacroiliac joint was visualized. Following this, 3 mL of preservative free 1% lidocaine was injected for a skin wheal overlying the inferior aspect of the joint. Following this, a 22G 3-1/2 inch spinal needle was injected through the skin wheal until engagement with inferior articular surface. 1 mL Omnipaque 240 contrast was then injected to delineate articular spread.   After negative aspiration for heme, the entirety of the above mentioned injectate was then injected to the right sacroiliac joint.  The procedure was repeated on LEFT SIJ  The patient tolerated the entirety of the procedure well and was taken to phase 2 recovery in stable condition with bilateral lower extremity motor and sensory intact.

## 2024-04-29 DIAGNOSIS — M46.1 SACROILIITIS, NOT ELSEWHERE CLASSIFIED: ICD-10-CM

## 2024-05-02 ENCOUNTER — NON-APPOINTMENT (OUTPATIENT)
Age: 54
End: 2024-05-02

## 2024-05-15 ENCOUNTER — APPOINTMENT (OUTPATIENT)
Dept: PHYSICAL MEDICINE AND REHAB | Facility: CLINIC | Age: 54
End: 2024-05-15

## 2024-05-20 ENCOUNTER — APPOINTMENT (OUTPATIENT)
Dept: PHYSICAL MEDICINE AND REHAB | Facility: CLINIC | Age: 54
End: 2024-05-20
Payer: MEDICARE

## 2024-05-20 VITALS
HEIGHT: 59 IN | WEIGHT: 143 LBS | DIASTOLIC BLOOD PRESSURE: 81 MMHG | SYSTOLIC BLOOD PRESSURE: 135 MMHG | BODY MASS INDEX: 28.83 KG/M2 | HEART RATE: 79 BPM

## 2024-05-20 DIAGNOSIS — F11.90 OPIOID USE, UNSPECIFIED, UNCOMPLICATED: ICD-10-CM

## 2024-05-20 PROCEDURE — G2211 COMPLEX E/M VISIT ADD ON: CPT

## 2024-05-20 PROCEDURE — 99214 OFFICE O/P EST MOD 30 MIN: CPT

## 2024-05-20 NOTE — HISTORY OF PRESENT ILLNESS
[FreeTextEntry1] : 54 y.o. F w/ h/o congenital hip dysplasia, LLD, s/p L NIRMALA (2005) and R TKA (2020) w/ CLBP, multifactorial gait dysfunction and b/l CTS returns to office for follow up.  Pt. had repeat bilateral SI joint CSI w/ Dr. Fay (4/26/24) which provided good pain relief but is now wearing off.  Pt. is reporting some pain radiating down lateral aspect left hip/thigh towards knee.  Feels like burning sensation.  Sometimes feels like leg is "going to give out".  Still walking with aide of SC and rollator.  The patient's prescription for oxycodone 10 mg was refilled on May 16.  She tolerates medication well w/o significant AEs.  Pt. states that by mid day her pain levels are about 5/10 intensity.

## 2024-05-20 NOTE — PHYSICAL EXAM
[FreeTextEntry1] : NAD A&Ox3 Non-obese Inspection ROM L-spine: 5-10' passive extension (painful) ROM Hips: right hip smooth IR/ER w/o pain; left hip restricted IR/ER Pelvic tilt: ++ Seated slump test: neg b/l SLR: neg b/l DTR's: 2+ knees; 1+ ankles (static) MMT: 4/5 B/L TA (static); 5/5 B/L GS (static) Sensation: SILT.  Hyperalgesia to PP along left LCN thigh distrib (new) Toe & Heel Walk: Deferred Palpation: b/l SI joints VTTP (concordant) Gait: vaults over right leg w/ hyperextension left knee (static) Feet - b/l L > R hallux valgus deformities Wrists Phalen's + Tinel's + Left thenar eminence atrophy

## 2024-05-20 NOTE — ASSESSMENT
[FreeTextEntry1] : 54 y.o. F w/ h/o congenital hip dysplasia, LLD, s/p L NIRMALA (2005) and R TKA (2020) w/ CLBP, multifactorial gait dysfunction and b/l CTS. I spent most of today's office visit (30 min) discussing etiology, pathogenesis and further non-operative vs. operative management.  EDX findings (Oct 2023) significant for (1) severe, predominantly demyelinating, sensorimotor, median mononeuropathy across the right wrist; (2) mild to moderate, demyelinating, sensorimotor, median mononeuropathy across the left wrist; (3) mild, demyelinating, motor, ulnar mononeuropathy across the left elbow.  I again encouraged the patient to reconsult Dr. Slime Sen for consideration of bilateral carpal tunnel release surgery as there are no good nonoperative measures for advanced CTS and repeat corticosteroid injections are not a good option moving forward.  Regarding the patient's persistent lumbosacral spine pain, her MRI L-spine (Sept. 2023) c/w Type I/II Modic changes L2-3; advanced DDD and FJ OA L4-5 & L5-S1 w/ Schmorl's node; R L4-5 and L L5-S1 NF stenosis; however, these changes do not necessarily correlate with the patient's axial spine pain. CT scanogram previously reviewed w/ the patient's right femur measuring 41.2 cm and left femur measuring 38.9 cm. Her tibial heights are symmetric. The patient is pending possible bilateral SI joint RFA w/ Dr. Fay after repeat CSI.  It is still my opinion that the patient's pathomechanics resulting from her leg length discrepancy status post left total hip arthroplasty are the root cause of her chronic lumbosacral back pain.  She now appears to be developing left-sided meralgia paresthetica likely secondary to functional entrapment of the lateral cutaneous nerve of the thigh.  We discussed ultrasound evaluation of the bilateral LCN of the thigh to compare the cross-sectional area left versus right followed by possible hydrodissection.  I again explained that patient's b/l (L > R) DF weakness may be 2' undiagnosed left sciatic mononeuropathy s/p NIRMALA as her new MRI did not show significant central canal stenosis and her NF stenosis is not marked.  We may consider further electrodiagnostic testing of her left leg in the future.    We again discussed the R/B/A to continue treatment with oxycodone 10 mg; 1 tab p.o. three times daily including but not limited to CNS, respiratory and GI depression. I will continue to prescribe 2-week courses of medication as the patient's addiction psychiatrist does not prescribe narcotic analgesics.  The patient understands that she still may be subject to random urine toxicology screens.  Pt. is in agreement with plan.  All questions answered.  RTC for ultrasound examination bilateral LCN thigh and prescription refill.  This patient is being managed for a complex chronic pain condition that requires ongoing medical management. The nature of this condition requires a longitudinal relationship and monitoring over time for appropriate treatment.

## 2024-05-31 ENCOUNTER — NON-APPOINTMENT (OUTPATIENT)
Age: 54
End: 2024-05-31

## 2024-06-06 ENCOUNTER — APPOINTMENT (OUTPATIENT)
Dept: PHYSICAL MEDICINE AND REHAB | Facility: CLINIC | Age: 54
End: 2024-06-06

## 2024-06-19 ENCOUNTER — APPOINTMENT (OUTPATIENT)
Dept: PHYSICAL MEDICINE AND REHAB | Facility: CLINIC | Age: 54
End: 2024-06-19
Payer: MEDICARE

## 2024-06-19 VITALS — HEIGHT: 59 IN | WEIGHT: 144 LBS | BODY MASS INDEX: 29.03 KG/M2

## 2024-06-19 VITALS — DIASTOLIC BLOOD PRESSURE: 75 MMHG | SYSTOLIC BLOOD PRESSURE: 120 MMHG | HEART RATE: 76 BPM

## 2024-06-19 DIAGNOSIS — M21.70 UNEQUAL LIMB LENGTH (ACQUIRED), UNSPECIFIED SITE: ICD-10-CM

## 2024-06-19 DIAGNOSIS — G56.03 CARPAL TUNNEL SYNDROM,BILATERAL UPPER LIMBS: ICD-10-CM

## 2024-06-19 DIAGNOSIS — Z96.642 PRESENCE OF LEFT ARTIFICIAL HIP JOINT: ICD-10-CM

## 2024-06-19 DIAGNOSIS — M53.3 SACROCOCCYGEAL DISORDERS, NOT ELSEWHERE CLASSIFIED: ICD-10-CM

## 2024-06-19 DIAGNOSIS — G89.4 CHRONIC PAIN SYNDROME: ICD-10-CM

## 2024-06-19 PROCEDURE — 99214 OFFICE O/P EST MOD 30 MIN: CPT

## 2024-06-19 PROCEDURE — G2211 COMPLEX E/M VISIT ADD ON: CPT

## 2024-06-19 NOTE — ASSESSMENT
[FreeTextEntry1] : 54 y.o. F w/ h/o congenital hip dysplasia, LLD, s/p L NIRMALA (2005) and R TKA (2020) w/ CLBP, multifactorial gait dysfunction and b/l CTS. I spent most of today's office visit (30 min) discussing etiology, pathogenesis and further non-operative vs. operative management.    Regarding the patient's persistent lumbosacral spine pain, her MRI L-spine (Sept. 2023) previously reviewed c/w Type I/II Modic changes L2-3; advanced DDD and FJ OA L4-5 & L5-S1 w/ Schmorl's node; R L4-5 and L L5-S1 NF stenosis; however, these changes do not necessarily correlate with the patient's axial spine pain. CT scanogram previously reviewed significant for right femur measuring 41.2 cm and left femur measuring 38.9 cm.  Her tibial heights are symmetric.  It is still my opinion that the patient's pathomechanics resulting from her leg length discrepancy status post left total hip arthroplasty are the root cause of her chronic lumbosacral back pain.  Unfortunately, she has not been able to proceed with my recommendation for b/l SI joint RFA.  I will refer her to Dr. Hai Wong at [facility] for an opinion regarding neuromodulation vs. SI joint fusion procedure.  She may also have a component of left-sided meralgia paresthetica likely secondary to functional entrapment of the lateral cutaneous nerve of the thigh.  We previously discussed ultrasound evaluation of the bilateral LCN of the thigh to compare the cross-sectional area left versus right followed by possible hydrodissection but will hold this and abeyance for now.    I again explained that patient's b/l (L > R) DF weakness may be 2' undiagnosed left sciatic mononeuropathy s/p NIRMALA as her new MRI did not show significant central canal stenosis and her NF stenosis is not marked.  We may consider further electrodiagnostic testing of her left leg in the future.    EDX findings (Oct 2023) significant for (1) severe, predominantly demyelinating, sensorimotor, median mononeuropathy across the right wrist; (2) mild to moderate, demyelinating, sensorimotor, median mononeuropathy across the left wrist; (3) mild, demyelinating, motor, ulnar mononeuropathy across the left elbow.  I again encouraged the patient to reconsult Dr. Slime Sen for consideration of bilateral carpal tunnel release surgery as there are no good nonoperative measures for advanced CTS and repeat corticosteroid injections are not a good option moving forward.  We again discussed the R/B/A to continue treatment with oxycodone 10 mg; 1 tab p.o. three times daily including but not limited to CNS, respiratory and GI depression.  As the patient feels that her pain medication is beginning to lose its effectiveness, we may consider an opioid rotation to a longer acting formulation to better cover her pain.  At this time, I will continue to prescribe 2-week courses of medication as the patient's addiction psychiatrist does not prescribe narcotic analgesics.  The patient understands that she still may be subject to random urine toxicology screens.  Pt. is in agreement with plan.  All questions answered.  RTC 4 weeks.  This patient is being managed for a complex chronic pain condition that requires ongoing medical management. The nature of this condition requires a longitudinal relationship and monitoring over time for appropriate treatment.

## 2024-06-19 NOTE — HISTORY OF PRESENT ILLNESS
[FreeTextEntry1] : 54 y.o. F w/ h/o congenital hip dysplasia, LLD, s/p L NIRMALA (2005) and R TKA (2020) w/ CLBP, multifactorial gait dysfunction and b/l CTS returns to office for follow up.  The patient states that her pain medication is losing effectiveness.  She experiences approximately 3 hours of relief after administration of 1 tab of oxycodone 10 mg.  She is tolerating the medication well without CNS depression/sedation.  She has yet to schedule follow-up with Dr. Fay for further discussion of sacroiliac joint radiofrequency ablation.  She still requires the aid of a straight cane for short distance ambulation.

## 2024-06-19 NOTE — PHYSICAL EXAM
COLONOSCOPY HISTORY AND PE    Procedure : Colonoscopy      INDICATIONS: personal history of colon polyps    Family Hx of CRC: none    Last Colonoscopy:    Findings: polyps       Past Medical History:   Diagnosis Date    After-cataract of both eyes - Both Eyes 2012    Allergy     Arthritis     Breast cancer     Diverticulosis     Hyperlipidemia     Hypertension     Hypothyroidism     Paget disease of bone     Squamous Cell Carcinoma     in situ right neck     Thyroid disease      Sedation Problems: NO  Family History   Problem Relation Age of Onset    Cancer Father         lung    Dementia Mother     Glaucoma Brother     Macular degeneration Brother     Diabetes Neg Hx     Amblyopia Neg Hx     Blindness Neg Hx     Cataracts Neg Hx     Retinal detachment Neg Hx     Strabismus Neg Hx     Melanoma Neg Hx      Fam Hx of Sedation Problems: NO  Social History     Socioeconomic History    Marital status:      Spouse name: Not on file    Number of children: Not on file    Years of education: Not on file    Highest education level: Not on file   Occupational History    Occupation: realtor     Employer: [employer]     Employer: [employer]   Social Needs    Financial resource strain: Not on file    Food insecurity:     Worry: Not on file     Inability: Not on file    Transportation needs:     Medical: Not on file     Non-medical: Not on file   Tobacco Use    Smoking status: Former Smoker     Packs/day: 2.00     Years: 44.00     Pack years: 88.00     Types: Cigarettes     Last attempt to quit: 1969     Years since quittin.3    Smokeless tobacco: Never Used   Substance and Sexual Activity    Alcohol use: Yes     Alcohol/week: 0.0 oz     Comment: rarely    Drug use: No    Sexual activity: Not Currently   Lifestyle    Physical activity:     Days per week: Not on file     Minutes per session: Not on file    Stress: Not on file   Relationships    Social  "connections:     Talks on phone: Not on file     Gets together: Not on file     Attends [employer] service: Not on file     Active member of club or organization: Not on file     Attends meetings of clubs or organizations: Not on file     Relationship status: Not on file   Other Topics Concern    Are you pregnant or think you may be? No    Breast-feeding No   Social History Narrative    Not on file       Review of Systems - Negative except   Respiratory ROS: no dyspnea  Cardiovascular ROS: no exertional chest pain  Gastrointestinal ROS: NO abdominal discomfort,  NO rectal bleeding  Musculoskeletal ROS: no muscular pain  Neurological ROS: no recent stroke    Physical Exam:  BP (!) 155/71 (BP Location: Right arm, Patient Position: Lying)   Pulse 65   Temp 97.9 °F (36.6 °C) (Temporal)   Resp 16   Ht 5' 3" (1.6 m)   Wt 80.7 kg (178 lb)   LMP  (LMP Unknown)   SpO2 95%   Breastfeeding? No   BMI 31.53 kg/m²   General: no distress  Head: normocephalic  Mallampati Score   Neck: supple, symmetrical, trachea midline  Lungs:  normal respiratory effort  Heart: regular rate and rhythm  Abdomen: soft, non-tender non-distented; bowel sounds normal; no masses,  no organomegaly  Extremities: no cyanosis or edema, or clubbing    ASA:  II    PLAN  COLONOSCOPY.    SedationPlan :MAC    The details of the procedure, the possible need for biopsy or polypectomy and the potential risks including bleeding, perforation, missed polyps were discussed in detail.      " [FreeTextEntry1] : NAD A&Ox3 Non-obese Inspection ROM L-spine: 5-10' passive extension (painful) ROM Hips: right hip smooth IR/ER w/o pain; left hip restricted IR/ER Pelvic tilt: ++ Seated slump test: neg b/l SLR: neg b/l DTR's: 2+ knees; 1+ ankles (static) MMT: 4/5 B/L TA (static); 5/5 B/L GS (static) Sensation: SILT.  Hyperalgesia to PP along left LCN thigh distrib (new) Toe & Heel Walk: Deferred Palpation: b/l SI joints VTTP (concordant) Gait: vaults over right leg w/ hyperextension left knee (static) Feet - b/l L > R hallux valgus deformities Wrists Phalen's + Tinel's + Left thenar eminence atrophy

## 2024-06-28 ENCOUNTER — NON-APPOINTMENT (OUTPATIENT)
Age: 54
End: 2024-06-28

## 2024-07-18 ENCOUNTER — APPOINTMENT (OUTPATIENT)
Dept: PHYSICAL MEDICINE AND REHAB | Facility: CLINIC | Age: 54
End: 2024-07-18

## 2024-07-26 ENCOUNTER — NON-APPOINTMENT (OUTPATIENT)
Age: 54
End: 2024-07-26

## 2024-07-30 ENCOUNTER — APPOINTMENT (OUTPATIENT)
Dept: PHYSICAL MEDICINE AND REHAB | Facility: CLINIC | Age: 54
End: 2024-07-30

## 2024-08-09 ENCOUNTER — NON-APPOINTMENT (OUTPATIENT)
Age: 54
End: 2024-08-09

## 2024-08-13 ENCOUNTER — APPOINTMENT (OUTPATIENT)
Dept: PHYSICAL MEDICINE AND REHAB | Facility: CLINIC | Age: 54
End: 2024-08-13

## 2024-08-23 ENCOUNTER — NON-APPOINTMENT (OUTPATIENT)
Age: 54
End: 2024-08-23

## 2024-08-27 ENCOUNTER — APPOINTMENT (OUTPATIENT)
Dept: PHYSICAL MEDICINE AND REHAB | Facility: CLINIC | Age: 54
End: 2024-08-27

## 2024-09-23 ENCOUNTER — APPOINTMENT (OUTPATIENT)
Dept: PHYSICAL MEDICINE AND REHAB | Facility: CLINIC | Age: 54
End: 2024-09-23

## 2024-10-03 NOTE — PROVIDER CONTACT NOTE (OTHER) - SITUATION
Left message to return call. If patient calls back, please route to [facility].     TCs, please send to RNs.    Iona Grant RN  [facility]  
Unable to assess dorsalis pedal pulses bilaterally
Pt. c/o discomfort & pruritis upper inner thigh area underneath where liliana brace is. Pt. refusing to wear brace & removed it on her own.

## 2024-10-15 ENCOUNTER — APPOINTMENT (OUTPATIENT)
Dept: PHYSICAL MEDICINE AND REHAB | Facility: CLINIC | Age: 54
End: 2024-10-15

## 2024-10-20 NOTE — ASSESSMENT
1934- Residents updated on pt's PCR results in person in department.    [M17.11] : supprative [NL] : warm

## 2024-12-03 ENCOUNTER — APPOINTMENT (OUTPATIENT)
Dept: OBGYN | Facility: CLINIC | Age: 54
End: 2024-12-03

## 2025-01-09 ENCOUNTER — APPOINTMENT (OUTPATIENT)
Dept: PHYSICAL MEDICINE AND REHAB | Facility: CLINIC | Age: 55
End: 2025-01-09